# Patient Record
Sex: FEMALE | Race: WHITE | URBAN - METROPOLITAN AREA
[De-identification: names, ages, dates, MRNs, and addresses within clinical notes are randomized per-mention and may not be internally consistent; named-entity substitution may affect disease eponyms.]

---

## 2017-07-12 ENCOUNTER — PRENATAL OFFICE VISIT (OUTPATIENT)
Dept: FAMILY MEDICINE | Facility: CLINIC | Age: 37
End: 2017-07-12
Payer: COMMERCIAL

## 2017-07-12 VITALS
WEIGHT: 124.3 LBS | DIASTOLIC BLOOD PRESSURE: 62 MMHG | SYSTOLIC BLOOD PRESSURE: 104 MMHG | HEIGHT: 67 IN | TEMPERATURE: 98.5 F | OXYGEN SATURATION: 99 % | HEART RATE: 72 BPM | BODY MASS INDEX: 19.51 KG/M2

## 2017-07-12 DIAGNOSIS — Z32.01 PREGNANCY TEST POSITIVE: ICD-10-CM

## 2017-07-12 DIAGNOSIS — N91.2 ABSENCE OF MENSTRUATION: Primary | ICD-10-CM

## 2017-07-12 LAB — BETA HCG QUAL IFA URINE: POSITIVE

## 2017-07-12 PROCEDURE — 99213 OFFICE O/P EST LOW 20 MIN: CPT | Performed by: FAMILY MEDICINE

## 2017-07-12 PROCEDURE — 84703 CHORIONIC GONADOTROPIN ASSAY: CPT | Performed by: FAMILY MEDICINE

## 2017-07-12 NOTE — NURSING NOTE
"No chief complaint on file.      Initial /62 (BP Location: Right arm, Patient Position: Chair, Cuff Size: Adult Regular)  Pulse 72  Temp 98.5  F (36.9  C) (Oral)  Ht 5' 7\" (1.702 m)  Wt 124 lb 4.8 oz (56.4 kg)  LMP 05/21/2017 (Exact Date)  SpO2 99%  BMI 19.47 kg/m2 Estimated body mass index is 19.47 kg/(m^2) as calculated from the following:    Height as of this encounter: 5' 7\" (1.702 m).    Weight as of this encounter: 124 lb 4.8 oz (56.4 kg).  Medication Reconciliation: complete   Aileen Daniels MA  "

## 2017-07-12 NOTE — MR AVS SNAPSHOT
"              After Visit Summary   7/12/2017    Megan Young    MRN: [id number]           Patient Information     Date Of Birth          1980        Visit Information        Provider Department      7/12/2017 1:45 PM Alisia Chen MD [facility]        Today's Diagnoses     Absence of menstruation    -  1    Pregnancy test positive           Follow-ups after your visit        Who to contact     If you have questions or need follow up information about today's clinic visit or your schedule please contact [facility] directly at [phone].  Normal or non-critical lab and imaging results will be communicated to you by [employer], letter or phone within 4 business days after the clinic has received the results. If you do not hear from us within 7 days, please contact the clinic through Waynautt or phone. If you have a critical or abnormal lab result, we will notify you by phone as soon as possible.  Submit refill requests through Oxsensis or call your pharmacy and they will forward the refill request to us. Please allow 3 business days for your refill to be completed.          Additional Information About Your Visit        MyChart Information     Oxsensis gives you secure access to your electronic health record. If you see a primary care provider, you can also send messages to your care team and make appointments. If you have questions, please call your primary care clinic.  If you do not have a primary care provider, please call [phone] and they will assist you.        Care EveryWhere ID     This is your Care EveryWhere ID. This could be used by other organizations to access your [place] medical records  [id number]        Your Vitals Were     Pulse Temperature Height Last Period Pulse Oximetry BMI (Body Mass Index)    72 98.5  F (36.9  C) (Oral) 5' 7\" (1.702 m) 05/21/2017 (Exact Date) 99% 19.47 kg/m2       Blood Pressure from Last 3 Encounters:   07/12/17 104/62   01/07/16 " 109/63   11/18/15 134/86    Weight from Last 3 Encounters:   07/12/17 124 lb 4.8 oz (56.4 kg)   01/07/16 130 lb (59 kg)   11/18/15 139 lb (63 kg)              We Performed the Following     Beta HCG qual IFA urine        Primary Care Provider Office Phone # Fax #    Alisia Chen [phone]       [facility] [address] [address]        Equal Access to Services     LELA ROBLES : Hadii aad ku hadasho Soomaali, waaxda luqadaha, qaybta kaalmada adeegyada, domingo bravo . So [facility] [phone].    ATENCIÓN: Si habla español, tiene a luis disposición servicios gratuitos de asistencia lingüística. Llame al [phone].    We comply with applicable federal civil rights laws and [place] laws. We do not discriminate on the basis of race, color, national origin, age, disability sex, sexual orientation or gender identity.            Thank you!     Thank you for choosing [facility]  for your care. Our goal is always to provide you with excellent care. Hearing back from our patients is one way we can continue to improve our services. Please take a few minutes to complete the written survey that you may receive in the mail after your visit with us. Thank you!             Your Updated Medication List - Protect others around you: Learn how to safely use, store and throw away your medicines at www.disposemymeds.org.          This list is accurate as of: 7/12/17  4:22 PM.  Always use your most recent med list.                   Brand Name Dispense Instructions for use Diagnosis    prenatal multivitamin  plus iron 27-0.8 MG Tabs per tablet      Take 1 tablet by mouth daily

## 2017-07-12 NOTE — PROGRESS NOTES
SUBJECTIVE:                                                    Megan Young is a 36 year old female who presents to clinic today for the following health issues:      Pregnancy Confirmation  Sure LMP   YURI 18  Today 7+3    Feels well   Taking PNV  Planned pregnancy  trying for 2 months  Feels well  No concerns  Had  with last pregnancy  Induced for SGA  Infant 6# but healthy  Int in combined OB care again        Problem list and histories reviewed & adjusted, as indicated.  Additional history: as documented    Patient Active Problem List   Diagnosis     CARDIOVASCULAR SCREENING; LDL GOAL LESS THAN 160     Need for Tdap vaccination     Supervision of normal first pregnancy     Supervision of normal first pregnancy in third trimester     Labor and delivery, indication for care     Pregnancy test positive     Past Surgical History:   Procedure Laterality Date     HC TOOTH EXTRACTION W/FORCEP      wisdom       Social History   Substance Use Topics     Smoking status: Never Smoker     Smokeless tobacco: Never Used     Alcohol use 0.6 oz/week     1 Glasses of wine per week      Comment: every month     Family History   Problem Relation Age of Onset     CANCER Father      kidney age 48     Lipids Paternal Grandmother      Lipids Maternal Grandfather      Hypertension Maternal Grandfather      CEREBROVASCULAR DISEASE Maternal Grandfather      Alzheimer Disease Maternal Grandfather      early dementia     Neurologic Disorder Mother      migraines     Hypertension Maternal Grandmother          Current Outpatient Prescriptions   Medication Sig Dispense Refill     Prenatal Vit-Fe Fumarate-FA (PRENATAL MULTIVITAMIN  PLUS IRON) 27-0.8 MG TABS Take 1 tablet by mouth daily         Reviewed and updated as needed this visit by clinical staff       Reviewed and updated as needed this visit by Provider         ROS:  Constitutional, HEENT, cardiovascular, pulmonary, gi and gu systems are negative, except as otherwise  "noted.    OBJECTIVE:                                                    /62 (BP Location: Right arm, Patient Position: Chair, Cuff Size: Adult Regular)  Pulse 72  Temp 98.5  F (36.9  C) (Oral)  Ht 5' 7\" (1.702 m)  Wt 124 lb 4.8 oz (56.4 kg)  LMP 05/21/2017 (Exact Date)  SpO2 99%  BMI 19.47 kg/m2  Body mass index is 19.47 kg/(m^2).  GENERAL APPEARANCE: healthy, alert and no distress    Results for orders placed or performed in visit on 07/12/17   Beta HCG qual IFA urine   Result Value Ref Range    Beta HCG Qual IFA Urine Positive (A) NEG        ASSESSMENT/PLAN:                                                    1. Absence of menstruation    - Beta HCG qual IFA urine    2. Pregnancy test positive  Reviewed pnc  Combined OB cares  FOBV at 10 WGA  Encouraged MFM and genetics discussion given age      Follow up with Provider - 10 WGA     Alisia Chen MD  [facility]  "

## 2017-08-10 ENCOUNTER — PRENATAL OFFICE VISIT (OUTPATIENT)
Dept: FAMILY MEDICINE | Facility: CLINIC | Age: 37
End: 2017-08-10
Payer: COMMERCIAL

## 2017-08-10 VITALS
HEART RATE: 81 BPM | HEIGHT: 67 IN | SYSTOLIC BLOOD PRESSURE: 111 MMHG | BODY MASS INDEX: 19.34 KG/M2 | WEIGHT: 123.2 LBS | DIASTOLIC BLOOD PRESSURE: 76 MMHG | OXYGEN SATURATION: 98 % | TEMPERATURE: 96.9 F

## 2017-08-10 DIAGNOSIS — Z34.81 ENCOUNTER FOR SUPERVISION OF OTHER NORMAL PREGNANCY IN FIRST TRIMESTER: Primary | ICD-10-CM

## 2017-08-10 LAB
ABO + RH BLD: NORMAL
ABO + RH BLD: NORMAL
BLD GP AB SCN SERPL QL: NORMAL
BLOOD BANK CMNT PATIENT-IMP: NORMAL
ERYTHROCYTE [DISTWIDTH] IN BLOOD BY AUTOMATED COUNT: 15.5 % (ref 10–15)
HCT VFR BLD AUTO: 34.2 % (ref 35–47)
HGB BLD-MCNC: 11.3 G/DL (ref 11.7–15.7)
MCH RBC QN AUTO: 26.5 PG (ref 26.5–33)
MCHC RBC AUTO-ENTMCNC: 33 G/DL (ref 31.5–36.5)
MCV RBC AUTO: 80 FL (ref 78–100)
PLATELET # BLD AUTO: 164 10E9/L (ref 150–450)
RBC # BLD AUTO: 4.27 10E12/L (ref 3.8–5.2)
SPECIMEN EXP DATE BLD: NORMAL
WBC # BLD AUTO: 7.6 10E9/L (ref 4–11)

## 2017-08-10 PROCEDURE — 86900 BLOOD TYPING SEROLOGIC ABO: CPT | Performed by: FAMILY MEDICINE

## 2017-08-10 PROCEDURE — 85027 COMPLETE CBC AUTOMATED: CPT | Performed by: FAMILY MEDICINE

## 2017-08-10 PROCEDURE — 86901 BLOOD TYPING SEROLOGIC RH(D): CPT | Performed by: FAMILY MEDICINE

## 2017-08-10 PROCEDURE — 86780 TREPONEMA PALLIDUM: CPT | Performed by: FAMILY MEDICINE

## 2017-08-10 PROCEDURE — 36415 COLL VENOUS BLD VENIPUNCTURE: CPT | Performed by: FAMILY MEDICINE

## 2017-08-10 PROCEDURE — 87340 HEPATITIS B SURFACE AG IA: CPT | Performed by: FAMILY MEDICINE

## 2017-08-10 PROCEDURE — 87491 CHLMYD TRACH DNA AMP PROBE: CPT | Performed by: FAMILY MEDICINE

## 2017-08-10 PROCEDURE — 87086 URINE CULTURE/COLONY COUNT: CPT | Performed by: FAMILY MEDICINE

## 2017-08-10 PROCEDURE — 87389 HIV-1 AG W/HIV-1&-2 AB AG IA: CPT | Performed by: FAMILY MEDICINE

## 2017-08-10 PROCEDURE — 99214 OFFICE O/P EST MOD 30 MIN: CPT | Performed by: FAMILY MEDICINE

## 2017-08-10 PROCEDURE — 86850 RBC ANTIBODY SCREEN: CPT | Performed by: FAMILY MEDICINE

## 2017-08-10 PROCEDURE — 87591 N.GONORRHOEAE DNA AMP PROB: CPT | Performed by: FAMILY MEDICINE

## 2017-08-10 PROCEDURE — 86762 RUBELLA ANTIBODY: CPT | Performed by: FAMILY MEDICINE

## 2017-08-10 NOTE — MR AVS SNAPSHOT
After Visit Summary   8/10/2017    Megan Young    MRN: [id number]           Patient Information     Date Of Birth          1980        Visit Information        Provider Department      8/10/2017 11:00 AM Alisia Chen MD [facility]        Today's Diagnoses     Encounter for supervision of other normal pregnancy in first trimester    -  1       Follow-ups after your visit        Future tests that were ordered for you today     Open Future Orders        Priority Expected Expires Ordered    US OB > 14 Weeks Complete Single Routine  8/10/2018 8/10/2017            Who to contact     If you have questions or need follow up information about today's clinic visit or your schedule please contact [facility] directly at [phone].  Normal or non-critical lab and imaging results will be communicated to you by MyChart, letter or phone within 4 business days after the clinic has received the results. If you do not hear from us within 7 days, please contact the clinic through MyChart or phone. If you have a critical or abnormal lab result, we will notify you by phone as soon as possible.  Submit refill requests through Heckyl or call your pharmacy and they will forward the refill request to us. Please allow 3 business days for your refill to be completed.          Additional Information About Your Visit        MyChart Information     Heckyl gives you secure access to your electronic health record. If you see a primary care provider, you can also send messages to your care team and make appointments. If you have questions, please call your primary care clinic.  If you do not have a primary care provider, please call [phone] and they will assist you.        Care EveryWhere ID     This is your Care EveryWhere ID. This could be used by other organizations to access your [place] medical records  [id number]        Your Vitals Were     Pulse Temperature Height Last  "Period Pulse Oximetry BMI (Body Mass Index)    81 96.9  F (36.1  C) (Oral) 5' 7\" (1.702 m) 05/21/2017 (Exact Date) 98% 19.3 kg/m2       Blood Pressure from Last 3 Encounters:   08/10/17 111/76   07/12/17 104/62   01/07/16 109/63    Weight from Last 3 Encounters:   08/10/17 123 lb 3.2 oz (55.9 kg)   07/12/17 124 lb 4.8 oz (56.4 kg)   01/07/16 130 lb (59 kg)              We Performed the Following     ABO/RH Type and Screen     Anti Treponema     CBC with Platelets     Chlamydia Tracomatis PCR     Hepatitis B surface antigen     HIV Antigen Antibody Combo     Neisseria Gonorrhoea PCR     Rubella Antibody IgG Quantitative     Urine Culture Aerobic Bacterial        Primary Care Provider Office Phone # Fax #    Ford CliffErnestina Chen [phone] [address] [address]        Equal Access to Services     LELA [facility] CenterLAITH : Hadii aad ku hadasho Soomaali, waaxda luqadaha, qaybta kaalmada adeegyada, waxay rejiin lalo bravo . So [facility] [phone].    ATENCIÓN: Si habla español, tiene a luis disposición servicios gratuitos de asistencia lingüística. Ll[facility] [phone].    We comply with applicable federal civil rights laws and [place] laws. We do not discriminate on the basis of race, color, national origin, age, disability sex, sexual orientation or gender identity.            Thank you!     Thank you for choosing [facility]  for your care. Our goal is always to provide you with excellent care. Hearing back from our patients is one way we can continue to improve our services. Please take a few minutes to complete the written survey that you may receive in the mail after your visit with us. Thank you!             Your Updated Medication List - Protect others around you: Learn how to safely use, store and throw away your medicines at www.disposemymeds.org.          This list is accurate as of: 8/10/17 11:45 AM.  Always use your most recent med list.       "             Brand Name Dispense Instructions for use Diagnosis    prenatal multivitamin plus iron 27-0.8 MG Tabs per tablet      Take 1 tablet by mouth daily

## 2017-08-10 NOTE — NURSING NOTE
"Chief Complaint   Patient presents with     Prenatal Care     /76  Pulse 81  Temp 96.9  F (36.1  C) (Oral)  Ht 5' 7\" (1.702 m)  Wt 123 lb 3.2 oz (55.9 kg)  LMP 05/21/2017 (Exact Date)  SpO2 98%  BMI 19.3 kg/m2 Estimated body mass index is 19.3 kg/(m^2) as calculated from the following:    Height as of this encounter: 5' 7\" (1.702 m).    Weight as of this encounter: 123 lb 3.2 oz (55.9 kg).  BP completed using cuff size: regular       Health Maintenance due pending provider review:  NONE    n/a      Emily Love CMA  "

## 2017-08-10 NOTE — PROGRESS NOTES
"  SUBJECTIVE: Megan Young is an 36 year old female here for initial OB visit.    Past Medical History of Father of Baby: No significant medical history    Sure LMP 5/21  Today 11+4  Feels well  YURI 2/26/18    Review of Systems:   Constitutional, HEENT, cardiovascular, pulmonary, gi and gu systems are negative, except as otherwise noted.       History Since Last Menstrual Period: No Problems    EXAM: Blood pressure 111/76, pulse 81, temperature 96.9  F (36.1  C), temperature source Oral, height 5' 7\" (1.702 m), weight 123 lb 3.2 oz (55.9 kg), last menstrual period 05/21/2017, SpO2 98 %, currently breastfeeding.  GENERAL APPEARANCE: healthy, alert and no distress  EYES: EOMI,  PERRL  HENT: ear canals and TM's normal and nose and mouth without ulcers or lesions  RESP: lungs clear to auscultation - no rales, rhonchi or wheezes  CV: regular rates and rhythm, normal S1 S2, no S3 or S4 and no murmur, click or rub -  ABDOMEN:  soft, nontender, no HSM or masses and bowel sounds normal    Pelvix exam:  Perineum: Intact;   Vulva: Normal;  Vagina: Normal mucosa, no discharge  Cervix: Parous, closed, mobile, no discharge;  Uterus: 11 weeks, 11cm, Retroverted, mobile;   Adnexa: Normal;  Rectum: Normal without lesion or mass;   Bony Pelvis: Adequate.     ASSESSMENT/ PLAN:  (Z34.81) Encounter for supervision of other normal pregnancy in first trimester  (primary encounter diagnosis)  Comment:   Plan: CBC with Platelets, HIV Antigen Antibody Combo,        Rubella Antibody IgG Quantitative, Hepatitis B         surface antigen, Anti Treponema, ABO/RH Type         and Screen, Urine Culture Aerobic Bacterial,         Neisseria Gonorrhoea PCR, Chlamydia Tracomatis         PCR, US OB > 14 Weeks Complete Single          Age 36, CHRISTOPHER noted   Reviewed option of seeing MFM for optional genetic counseling - she declined this - no concerns but will talk to her  further  Follow up in 4 weeks.  Normal exercise.  Normal sexual " activity.  Prenatal vitamins.

## 2017-08-11 LAB
BACTERIA SPEC CULT: NO GROWTH
C TRACH DNA SPEC QL NAA+PROBE: NORMAL
HBV SURFACE AG SERPL QL IA: NONREACTIVE
HIV 1+2 AB+HIV1 P24 AG SERPL QL IA: NORMAL
MICRO REPORT STATUS: NORMAL
N GONORRHOEA DNA SPEC QL NAA+PROBE: NORMAL
RUBV IGG SERPL IA-ACNC: 60 IU/ML
SPECIMEN SOURCE: NORMAL
T PALLIDUM IGG+IGM SER QL: NEGATIVE

## 2017-08-28 ENCOUNTER — TELEPHONE (OUTPATIENT)
Dept: FAMILY MEDICINE | Facility: CLINIC | Age: 37
End: 2017-08-28

## 2017-08-28 NOTE — TELEPHONE ENCOUNTER
Reason for Call:  Other Referral    Detailed comments: For her Ultra sound, she has reached the cut off age and needs a referral to get this done    Phone Number Patient can be reached at: Home number on file [phone] (home)    Best Time: anytime    Can we leave a detailed message on this number? YES    Call taken on 8/28/2017 at 10:32 AM by Cynthia Hansen

## 2017-08-29 NOTE — TELEPHONE ENCOUNTER
Cynthia you message below what does that mean? She reached the cut off age?    Bhakti Pritchett  Referral Coordinator

## 2017-09-06 ENCOUNTER — PRENATAL OFFICE VISIT (OUTPATIENT)
Dept: FAMILY MEDICINE | Facility: CLINIC | Age: 37
End: 2017-09-06
Payer: COMMERCIAL

## 2017-09-06 VITALS
DIASTOLIC BLOOD PRESSURE: 62 MMHG | SYSTOLIC BLOOD PRESSURE: 100 MMHG | WEIGHT: 126.8 LBS | HEART RATE: 66 BPM | OXYGEN SATURATION: 99 % | TEMPERATURE: 97.8 F | HEIGHT: 67 IN | BODY MASS INDEX: 19.9 KG/M2 | RESPIRATION RATE: 14 BRPM

## 2017-09-06 DIAGNOSIS — Z34.02 ENCOUNTER FOR SUPERVISION OF NORMAL FIRST PREGNANCY IN SECOND TRIMESTER: Primary | ICD-10-CM

## 2017-09-06 PROCEDURE — 99212 OFFICE O/P EST SF 10 MIN: CPT | Performed by: FAMILY MEDICINE

## 2017-09-06 NOTE — NURSING NOTE
"Chief Complaint   Patient presents with     Prenatal Care     14 weeks       Initial /62  Pulse 66  Temp 97.8  F (36.6  C) (Oral)  Resp 14  Ht 5' 7\" (1.702 m)  Wt 126 lb 12.8 oz (57.5 kg)  LMP 05/21/2017 (Exact Date)  SpO2 99%  Breastfeeding? No  BMI 19.86 kg/m2 Estimated body mass index is 19.86 kg/(m^2) as calculated from the following:    Height as of this encounter: 5' 7\" (1.702 m).    Weight as of this encounter: 126 lb 12.8 oz (57.5 kg).  BP completed using cuff size: regular    Health Maintenance that is potentially due pending provider review:  Health Maintenance Due   Topic Date Due     MATERNAL SCREENING  09/03/2017     INFLUENZA VACCINE (SYSTEM ASSIGNED)  09/01/2017         Per provider  "

## 2017-09-06 NOTE — MR AVS SNAPSHOT
After Visit Summary   9/6/2017    Megan Young    MRN: [id number]           Patient Information     Date Of Birth          1980        Visit Information        Provider Department      9/6/2017 1:30 PM Alisia Chen MD [facility]        Today's Diagnoses     Encounter for supervision of normal first pregnancy in second trimester    -  1      Care Instructions    [facility] ultrasound and visit with Genetics: [phone]          Follow-ups after your visit        Additional Services     MAT FETAL MED CTR REFERRAL-PREGNANCY       >> Patient may proceed with recommendations for further testing as directed by the Maternal Fetal Medicine Specialist >>    >> If requesting Fetal Echo: M will determine appropriate location for exam due to indication.    >> If requesting Lung Maturity Amnio:  If results indicate fetal lung maturity, induction or C/S is recommended within 36 hours.  Please schedule accordingly.     Dear Patient:   Please be aware that coverage of these services is subject to the terms and limitations of your health insurance plan.  Call member services at your health plan with any benefit or coverage questions.      Please bring the following to your appointment:    >>  Any x-rays, CTs or MRIs which have been performed.  Contact the facility where they were done to arrange for  prior to your scheduled appointment.  Any new CT, MRI or other procedures ordered by your specialist must be performed at a [facility] facility or coordinated by your clinic's referral office.  >>  List of current medications   >>  This referral request   >>  Any documents/labs given to you for this referral                  Future tests that were ordered for you today     Open Future Orders        Priority Expected Expires Ordered    [facility] Genetic Counseling Routine  9/7/2018 9/6/2017    [facility] US Comprehensive Single Routine  7/6/2018 9/6/2017            Who to contact     If you have  "questions or need follow up information about today's clinic visit or your schedule please contact [facility] directly at [phone].  Normal or non-critical lab and imaging results will be communicated to you by MyChart, letter or phone within 4 business days after the clinic has received the results. If you do not hear from us within 7 days, please contact the clinic through Instacoachhart or phone. If you have a critical or abnormal lab result, we will notify you by phone as soon as possible.  Submit refill requests through VUELOGIC or call your pharmacy and they will forward the refill request to us. Please allow 3 business days for your refill to be completed.          Additional Information About Your Visit        [employer] Information     VUEL[employer]IC gives you secure access to your electronic health record. If you see a primary care provider, you can also send messages to your care team and make appointments. If you have questions, please call your primary care clinic.  If you do not have a primary care provider, please call [phone] and they will assist you.        Care EveryWhere ID     This is your Care EveryWhere ID. This could be used by other organizations to access your [place] medical records  [id number]        Your Vitals Were     Pulse Temperature Respirations Height Last Period Pulse Oximetry    66 97.8  F (36.6  C) (Oral) 14 5' 7\" (1.702 m) 05/21/2017 (Exact Date) 99%    Breastfeeding? BMI (Body Mass Index)                No 19.86 kg/m2           Blood Pressure from Last 3 Encounters:   09/06/17 100/62   08/10/17 111/76   07/12/17 104/62    Weight from Last 3 Encounters:   09/06/17 126 lb 12.8 oz (57.5 kg)   08/10/17 123 lb 3.2 oz (55.9 kg)   07/12/17 124 lb 4.8 oz (56.4 kg)              We Performed the Following     MAT FETAL MED CTR REFERRAL-PREGNANCY        Primary Care Provider Office Phone # Fax #    Alisia Chen [phone]       [address] [facility] [address]  [facility] [address]        Equal Access to Services     LELA ROBLES : Hadii aad ku hadtrammaría Pruitt, walandon martinez, domingo pierce. So [facility] [phone].    ATENCIÓN: Si habla español, tiene a luis disposición servicios gratuitos de asistencia lingüística. Llame al [phone].    We comply with applicable federal civil rights laws and [place] laws. We do not discriminate on the basis of race, color, national origin, age, disability sex, sexual orientation or gender identity.            Thank you!     Thank you for choosing [facility]  for your care. Our goal is always to provide you with excellent care. Hearing back from our patients is one way we can continue to improve our services. Please take a few minutes to complete the written survey that you may receive in the mail after your visit with us. Thank you!             Your Updated Medication List - Protect others around you: Learn how to safely use, store and throw away your medicines at www.disposemymeds.org.          This list is accurate as of: 9/6/17 11:59 PM.  Always use your most recent med list.                   Brand Name Dispense Instructions for use Diagnosis    prenatal multivitamin plus iron 27-0.8 MG Tabs per tablet      Take 1 tablet by mouth daily

## 2017-09-06 NOTE — PROGRESS NOTES
SUBJECTIVE:   Megan Young is a 36 year old female who presents to clinic today for the following health issues:      Prenatal care  15w4d  No concerns   Feels well  Denies LOF, VB CTNS  Due for: no screens - declines additional screens today  Plans for MFM /genetics  referral for routine US given age >35    Anticipatory guidance reviewed  RTC 4 weeks with DILLON  SN

## 2017-09-26 ENCOUNTER — PRE VISIT (OUTPATIENT)
Dept: MATERNAL FETAL MEDICINE | Facility: CLINIC | Age: 37
End: 2017-09-26

## 2017-09-28 ENCOUNTER — OFFICE VISIT (OUTPATIENT)
Dept: MATERNAL FETAL MEDICINE | Facility: CLINIC | Age: 37
End: 2017-09-28
Attending: FAMILY MEDICINE
Payer: COMMERCIAL

## 2017-09-28 ENCOUNTER — PRENATAL OFFICE VISIT (OUTPATIENT)
Dept: MIDWIFE SERVICES | Facility: CLINIC | Age: 37
End: 2017-09-28
Payer: COMMERCIAL

## 2017-09-28 ENCOUNTER — HOSPITAL ENCOUNTER (OUTPATIENT)
Dept: ULTRASOUND IMAGING | Facility: CLINIC | Age: 37
Discharge: HOME | End: 2017-09-28
Attending: FAMILY MEDICINE | Admitting: OBSTETRICS & GYNECOLOGY
Payer: COMMERCIAL

## 2017-09-28 VITALS
HEART RATE: 80 BPM | BODY MASS INDEX: 20.36 KG/M2 | SYSTOLIC BLOOD PRESSURE: 102 MMHG | OXYGEN SATURATION: 97 % | TEMPERATURE: 98 F | WEIGHT: 130 LBS | DIASTOLIC BLOOD PRESSURE: 69 MMHG

## 2017-09-28 DIAGNOSIS — O26.90 PREGNANCY RELATED CONDITION, UNSPECIFIED TRIMESTER: ICD-10-CM

## 2017-09-28 DIAGNOSIS — O09.522 AMA (ADVANCED MATERNAL AGE) MULTIGRAVIDA 35+, SECOND TRIMESTER: Primary | ICD-10-CM

## 2017-09-28 DIAGNOSIS — Z34.03 SUPERVISION OF NORMAL FIRST PREGNANCY IN THIRD TRIMESTER: ICD-10-CM

## 2017-09-28 DIAGNOSIS — Z23 NEED FOR PROPHYLACTIC VACCINATION AND INOCULATION AGAINST INFLUENZA: Primary | ICD-10-CM

## 2017-09-28 DIAGNOSIS — Z82.79 FAMILY HISTORY OF CONGENITAL HEART DISEASE: ICD-10-CM

## 2017-09-28 PROCEDURE — 90686 IIV4 VACC NO PRSV 0.5 ML IM: CPT | Performed by: ADVANCED PRACTICE MIDWIFE

## 2017-09-28 PROCEDURE — 90471 IMMUNIZATION ADMIN: CPT | Performed by: ADVANCED PRACTICE MIDWIFE

## 2017-09-28 PROCEDURE — 40000072 ZZH STATISTIC GENETIC COUNSELING, < 16 MIN: Mod: ZF | Performed by: GENETIC COUNSELOR, MS

## 2017-09-28 PROCEDURE — 76811 OB US DETAILED SNGL FETUS: CPT

## 2017-09-28 PROCEDURE — 99212 OFFICE O/P EST SF 10 MIN: CPT | Mod: 25 | Performed by: ADVANCED PRACTICE MIDWIFE

## 2017-09-28 NOTE — MR AVS SNAPSHOT
After Visit Summary   9/28/2017    Megan Young    MRN: [id number]           Patient Information     Date Of Birth          1980        Visit Information        Provider Department      9/28/2017 10:00 AM Chriss Sky MD [facility] Maternal Fetal Medicine [facility]        Today's Diagnoses     AMA (advanced maternal age) multigravida 35+, second trimester    -  1       Follow-ups after your visit        Your next 10 appointments already scheduled     Sep 28, 2017 10:30 AM CDT   ESTABLISHED PRENATAL with Arline Schaffer CNM   [facility] ([facility])    [address] [place] [address] [phone]              Who to contact     If you have questions or need follow up information about today's clinic visit or your schedule please contact [facility] MATERNAL FETAL MEDICINE [facility] directly at [phone].  Normal or non-critical lab and imaging results will be communicated to you by RxAdvancehart, letter or phone within 4 business days after the clinic has received the results. If you do not hear from us within 7 days, please contact the clinic through [employer] or phone. If you have a critical or abnormal lab result, we will notify you by phone as soon as possible.  Submit refill requests through Lifeblob or call your pharmacy and they will forward the refill request to us. Please allow 3 business days for your refill to be completed.          Additional Information About Your Visit        MyChart Information     Lifeblob gives you secure access to your electronic health record. If you see a primary care provider, you can also send messages to your care team and make appointments. If you have questions, please call your primary care clinic.  If you do not have a primary care provider, please call [phone] and they will assist you.        Care EveryWhere ID     This is your Care EveryWhere ID. This could be used by other  organizations to access your [place] medical records  [id number]        Your Vitals Were     Last Period                   05/21/2017 (Exact Date)            Blood Pressure from Last 3 Encounters:   09/06/17 100/62   08/10/17 111/76   07/12/17 104/62    Weight from Last 3 Encounters:   09/06/17 57.5 kg (126 lb 12.8 oz)   08/10/17 55.9 kg (123 lb 3.2 oz)   07/12/17 56.4 kg (124 lb 4.8 oz)              Today, you had the following     No orders found for display       Primary Care Provider Office Phone # Fax #    Alisia Chen [phone] [address] [address]        Equal Access to Services     ARTHUR ROBLES : Hadii aad ku hadasho Soomaali, waaxda luqadaha, qaybta kaalmada adeegyada, domingo bravo . So [facility] [phone].    ATENCIÓN: Si habla español, tiene a luis disposición servicios gratuitos de asistencia lingüística. Llame al [phone].    We comply with applicable federal civil rights laws and [place] laws. We do not discriminate on the basis of race, color, national origin, age, disability sex, sexual orientation or gender identity.            Thank you!     Thank you for choosing [facility]  for your care. Our goal is always to provide you with excellent care. Hearing back from our patients is one way we can continue to improve our services. Please take a few minutes to complete the written survey that you may receive in the mail after your visit with us. Thank you!             Your Updated Medication List - Protect others around you: Learn how to safely use, store and throw away your medicines at www.disposemymeds.org.          This list is accurate as of: 9/28/17 10:24 AM.  Always use your most recent med list.                   Brand Name Dispense Instructions for use Diagnosis    prenatal multivitamin plus iron 27-0.8 MG Tabs per tablet      Take 1 tablet by mouth daily

## 2017-09-28 NOTE — PROGRESS NOTES
"Please see \"Imaging\" tab under \"Chart Review\" for details of today's US at the [facility].    Chriss Sky MD  Maternal-Fetal Medicine      "

## 2017-09-28 NOTE — MR AVS SNAPSHOT
After Visit Summary   9/28/2017    Megan Young    MRN: [id number]           Patient Information     Date Of Birth          1980        Visit Information        Provider Department      9/28/2017 10:30 AM Arline Schaffer CNM [facility]        Today's Diagnoses     Need for prophylactic vaccination and inoculation against influenza    -  1    Supervision of normal first pregnancy in third trimester           Follow-ups after your visit        Follow-up notes from your care team     Return in about 4 weeks (around 10/26/2017).      Your next 10 appointments already scheduled     Oct 23, 2017  9:30 AM CDT   TAMIKA READ SCREEN FETAL EHCO [place] with [id number]   [facility] [place] ([facility])    [address]  [facility] [address] [phone]            Oct 23, 2017 10:00 AM CDT   Radiology MD with UR TAMIKA CABRAL   [facility] [place] ([facility])    [address]  [facility] [address]   [phone]           Please arrive at the time given for your first appointment. This visit is used internally to schedule the physician's time during your ultrasound.              Future tests that were ordered for you today     Open Future Orders        Priority Expected Expires Ordered    SELAM Read Screen Fetal Echo Single Routine  7/28/2018 9/28/2017            Who to contact     If you have questions or need follow up information about today's clinic visit or your schedule please contact [facility] directly at [phone].  Normal or non-critical lab and imaging results will be communicated to you by MyChart, letter or phone within 4 business days after the clinic has received the results. If you do not hear from us within 7 days, please contact the clinic through MyChart or phone. If you have a critical or abnormal lab  result, we will notify you by phone as soon as possible.  Submit refill requests through Pond5 or call your pharmacy and they will forward the refill request to us. Please allow 3 business days for your refill to be completed.          Additional Information About Your Visit        Caprizahart Information     Pond5 gives you secure access to your electronic health record. If you see a primary care provider, you can also send messages to your care team and make appointments. If you have questions, please call your primary care clinic.  If you do not have a primary care provider, please call [phone] and they will assist you.        Care EveryWhere ID     This is your Care EveryWhere ID. This could be used by other organizations to access your [place] medical records  [id number]        Your Vitals Were     Pulse Temperature Last Period Pulse Oximetry BMI (Body Mass Index)       80 98  F (36.7  C) (Oral) 05/21/2017 (Exact Date) 97% 20.36 kg/m2        Blood Pressure from Last 3 Encounters:   09/28/17 102/69   09/06/17 100/62   08/10/17 111/76    Weight from Last 3 Encounters:   09/28/17 130 lb (59 kg)   09/06/17 126 lb 12.8 oz (57.5 kg)   08/10/17 123 lb 3.2 oz (55.9 kg)              We Performed the Following     ADMIN INFLUENZA (For MEDICARE Patients ONLY) []     FLU VAC, SPLIT VIRUS IM > 3 YO (QUADRIVALENT) [38810]     Vaccine Administration, Initial [65494]        Primary Care Provider Office Phone # Fax #    OtterbeinErnestina Chen [phone] [address] [address]        Equal Access to Services     [facility] Hospital[facility] : Hadjennifer Foster, domingo pierce. So [facility] [phone].    ATENCIÓN: Si habla español, tiene a luis disposición servicios gratuitos de asistencia lingüística. Kayla al [phone].    We comply with applicable federal civil rights laws and [place] laws. We do not  discriminate on the basis of race, color, national origin, age, disability sex, sexual orientation or gender identity.            Thank you!     Thank you for choosing [facility]  for your care. Our goal is always to provide you with excellent care. Hearing back from our patients is one way we can continue to improve our services. Please take a few minutes to complete the written survey that you may receive in the mail after your visit with us. Thank you!             Your Updated Medication List - Protect others around you: Learn how to safely use, store and throw away your medicines at www.disposemymeds.org.          This list is accurate as of: 9/28/17 11:27 AM.  Always use your most recent med list.                   Brand Name Dispense Instructions for use Diagnosis    prenatal multivitamin plus iron 27-0.8 MG Tabs per tablet      Take 1 tablet by mouth daily

## 2017-09-28 NOTE — PROGRESS NOTES
Feeling well.  Baby is active. Denies any leaking of fluid, vaginal bleeding, regular uterine contractions, or headaches or other concerns.  Had MFM US today.  They have the gender in an envelope and don't know if they will look or not.  049 education.    Discussed GCT at 24 weeks.    Reviewed to call [phone] for contractions, loss of fluid, vaginal bleeding, decreased fetal movement or any other questions or concerns.    RTC in 6 weeks.  Arline Schaffer, JASPAL, APRN, CNM

## 2017-09-28 NOTE — PROGRESS NOTES
Injectable Influenza Immunization Documentation    1.  Is the person to be vaccinated sick today?   No    2. Does the person to be vaccinated have an allergy to a component   of the vaccine?   No    3. Has the person to be vaccinated ever had a serious reaction   to influenza vaccine in the past?   No    4. Has the person to be vaccinated ever had Guillain-Barré syndrome?   No    Form completed by Rogerio RODRIGUEZ

## 2017-09-28 NOTE — PROGRESS NOTES
[facility]  Genetic Counseling Consult    Patient:  Megan Young YOB: 1980   Date of Service:  17      Megan Young was seen at the [facility] with her  for genetic consultation as part of her appointment for comprehensive ultrasound.  The indication for genetic counseling is advanced maternal age.       Impression/Plan:   1. Megan has not had serum screening in this pregnancy.     2. Megan had a comprehensive (level II) ultrasound today, the results of which were normal.  Please see the ultrasound report for further details.    3. The patient declines genetic amniocentesis and maternal serum screening today.     4.   Megan will return for a fetal echocardiogram to assess fetal heart structures given paternal family history of congenital heart defects.     Pregnancy History:   /Parity:    Age at Delivery: 37 year old  YURI: 2018, by Last Menstrual Period  Gestational Age: 18w4d    No significant complications or exposures were reported in the current pregnancy.    Megan s pregnancy history is significant for 1 prior full term pregnancy with no reported complications.    Medical History:   Megan s reported medical history is not expected to impact pregnancy management or risks to fetal development.       Family History:   A three-generation pedigree was obtained, and is scanned under the  Media  tab.   The following significant findings were reported by Megan:    1 son, 2 years old, healthy and developmentally on track, parents were told by one pediatrician of a possible heart murmur, but no follow up was discussed and no other physicians have noted any concerns.      1 brother and 1 sister, both healthy    Father  at 46 from kidney cancer    Mother alive and well in her mid 60s    FOB: 37, healthy, with no children from any other relationships.     FOB: 1 sister and 1  "brother, both healthy, with 3 healthy children each    FOB: Mother diagnosed with breast cancer at 50 years of age, currently healthy    FOB: Father with a heart murmer and reported \"hole in heart since birth\" otherwise healthy    FOB: Maternal grandmother  from metastatic disease secondary to ovarian cancer    FOB: Paternal uncle with Parkinson's disease, onset believed to be in his 50's.       Otherwise, the reported family history is negative for multiple miscarriages, stillbirths, birth defects, cognitive impairment, known genetic conditions, and consanguinity.    Congenital Heart Defect:  We discussed that birth defects such as a hole in the heart can have genetic components, and can be seen to run in families.  While we did not discuss specific risk numbers, we did talk in general terms about the possibility of increased risk for the current pregnancy to have a change in the development of the heart.  We discussed that screening for this can be done through a specialized ultrasound, typically done after 22 weeks gestation.     Hereditary Breast and Ovarian Cancer:  There are genetic changes that can put individuals at increased risk for developing certain types of cancer.  Roughly 1 in 400 individuals has a mutation in either the BRCA1 or BRCA2 gene that puts them at increased risk for developing multiple types of cancer, including breast, ovarian, pancreatic, and melanoma for women, and breast, prostate, pancreatic, and melanoma for men.  Red flags in a family history that indicate a possible hereditary cancer syndrome include early onset breast cancer, ovarian cancer at any age, and multiple generations of individuals affected with cancer.  We discussed that Megan's 's family history is suggestive of a possible hereditary cancer syndrome, and that further evaluation by a genetic counselor who specializes in these conditions could prove useful for this family.  Megan and her  reported " that they have a lot of stressors in their lives right now, including the current pregnancy, possible employment changes, and a possible relocation out of the country for their family, but that they would consider following up on this when they feel less stressed.  I provided them with the contact information for [facility] if they have any further questions or concerns.           Carrier Screening:   The patient reports that she and the father of the pregnancy have  ancestry:      Cystic fibrosis is an autosomal recessive genetic condition that occurs with increased frequency in individuals of  ancestry and carrier screening for this condition is available.  In addition,  screening in the [facility] includes cystic fibrosis.      Expanded carrier screening for mutations in a large panel of genes associated with autosomal recessive conditions including cystic fibrosis, spinal muscular atrophy, and others, is now available.      The patient has declined the carrier screening options reviewed today.       Risk Assessment for Chromosome Conditions:   We explained that the risk for fetal chromosome abnormalities increases with maternal age. We discussed specific features of common chromosome abnormalities, including Down syndrome, trisomy 13, trisomy 18, and sex chromosome trisomies.      - At age 37 at midtrimester, the risk to have a baby with Down syndrome is 1 in 216.     - At age 37 at midtrimester, the risk to have a baby with any chromosome abnormality is 1 in 105.     - At age 37 at delivery, the risk to have a baby with Down syndrome is 1 in 294.     - At age 37 at delivery, the risk to have a baby with any chromosome abnormality is 1 in 163.       Megan did not have maternal serum screening earlier in pregnancy.         Testing Options:   We discussed the following options:   Non-invasive Prenatal Testing (NIPT)    Maternal plasma cell-free DNA  testing; first trimester ultrasound with nuchal translucency and nasal bone assessment is recommended, when appropriate    Screens for fetal trisomy 21, trisomy 13, trisomy 18, and sex chromosome aneuploidy    Cannot screen for open neural tube defects; maternal serum AFP after 15 weeks is recommended     Genetic Amniocentesis    Invasive procedure typically performed in the second trimester by which amniotic fluid is obtained for the purpose of chromosome analysis and/or other prenatal genetic analysis    Diagnostic results; >99% sensitivity for fetal chromosome abnormalities    AFAFP measurement tests for open neural tube defects     Comprehensive (Level II) ultrasound:     Detailed ultrasound performed between 18-22 weeks gestation.    Screen for major birth defects and markers for aneuploidy.      We reviewed the benefits and limitations of this testing.  Screening tests provide a risk assessment specific to the pregnancy for certain fetal chromosome abnormalities, but cannot definitively diagnose or exclude a fetal chromosome abnormality.  Follow-up genetic counseling and consideration of diagnostic testing is recommended with any abnormal screening result.     Diagnostic tests carry inherent risks- including risk of miscarriage- that require careful consideration.  These tests can detect fetal chromosome abnormalities with greater than 99% certainty.  Results can be compromised by maternal cell contamination or mosaicism, and are limited by the resolution of cytogenetic G-banding technology.  There is no screening nor diagnostic test that can detect all forms of birth defects or mental disability.    It was a pleasure to be involved with Megan [facility]. Face-to-face time of the meeting was 35 minutes.    Hammad Acevedo MS, [facility]  Certified Genetic Counselor  Phone: [phone]  Pager: [phone]

## 2017-09-28 NOTE — MR AVS SNAPSHOT
After Visit Summary   9/28/2017    Megan Young    MRN: [id number]           Patient Information     Date Of Birth          1980        Visit Information        Provider Department      9/28/2017 8:45 AM Hammad Acevedo GC [facility] Maternal Fetal Medicine [facility]        Today's Diagnoses     AMA (advanced maternal age) multigravida 35+, second trimester    -  1    Pregnancy related condition, unspecified trimester           Follow-ups after your visit        Your next 10 appointments already scheduled     Oct 23, 2017  9:30 AM CDT   M READ SCREEN FETAL EHCO [place] with [id number]   [facility] Maternal Fetal Medicine Ultrasound - [place] ([facility])    [address]  [facility] [address]   [phone]            Oct 23, 2017 10:00 AM CDT   Radiology MD with UR TAMIKA CABRAL   [facility] Maternal Fetal Medicine - [place] ([facility])    [address]  [facility] [address]   [phone]           Please arrive at the time given for your first appointment. This visit is used internally to schedule the physician's time during your ultrasound.              Future tests that were ordered for you today     Open Future Orders        Priority Expected Expires Ordered    [facility] Read Screen Fetal Echo Single Routine  7/28/2018 9/28/2017            Who to contact     If you have questions or need follow up information about today's clinic visit or your schedule please contact [facility] MATERNAL FETAL MEDICINE [facility] directly at [phone].  Normal or non-critical lab and imaging results will be communicated to you by MyChart, letter or phone within 4 business days after the clinic has received the results. If you do not hear from us within 7 days, please contact the clinic through MyChart or phone. If you have a critical or abnormal lab result, we will notify you by phone as soon as possible.  Submit refill  requests through [email or web address] or call your pharmacy and they will forward the refill request to us. Please allow 3 business days for your refill to be completed.          Additional Information About Your Visit        OneSpothart Information     [email or web address] gives you secure access to your electronic health record. If you see a primary care provider, you can also send messages to your care team and make appointments. If you have questions, please call your primary care clinic.  If you do not have a primary care provider, please call [phone] and they will assist you.        Care EveryWhere ID     This is your Care EveryWhere ID. This could be used by other organizations to access your [place] medical records  [id number]        Your Vitals Were     Last Period                   05/21/2017 (Exact Date)            Blood Pressure from Last 3 Encounters:   09/28/17 102/69   09/06/17 100/62   08/10/17 111/76    Weight from Last 3 Encounters:   09/28/17 59 kg (130 lb)   09/06/17 57.5 kg (126 lb 12.8 oz)   08/10/17 55.9 kg (123 lb 3.2 oz)              We Performed the Following     [facility] Genetic Counseling        Primary Care Provider Office Phone # Fax #    Alisia Chen [phone] [address] [place] [address]        Equal Access to Services     ARTHUR ROBLES : Hadii nena ku hadasho Soomaali, waaxda luqadaha, qaybta kaalmada adeegyada, domingo bravo . So [facility] [phone].    ATENCIÓN: Si habla español, tiene a luis disposición servicios gratuitos de asistencia lingüística. Llame al [phone].    We comply with applicable federal civil rights laws and [place] laws. We do not discriminate on the basis of race, color, national origin, age, disability sex, sexual orientation or gender identity.            Thank you!     Thank you for choosing [facility]  for your care. Our goal is always to provide you with excellent care.  Hearing back from our patients is one way we can continue to improve our services. Please take a few minutes to complete the written survey that you may receive in the mail after your visit with us. Thank you!             Your Updated Medication List - Protect others around you: Learn how to safely use, store and throw away your medicines at www.disposemymeds.org.          This list is accurate as of: 9/28/17 12:06 PM.  Always use your most recent med list.                   Brand Name Dispense Instructions for use Diagnosis    prenatal multivitamin plus iron 27-0.8 MG Tabs per tablet      Take 1 tablet by mouth daily

## 2017-10-11 ENCOUNTER — MYC MEDICAL ADVICE (OUTPATIENT)
Dept: FAMILY MEDICINE | Facility: CLINIC | Age: 37
End: 2017-10-11

## 2017-10-19 ENCOUNTER — TELEPHONE (OUTPATIENT)
Dept: MATERNAL FETAL MEDICINE | Facility: CLINIC | Age: 37
End: 2017-10-19

## 2017-10-19 NOTE — TELEPHONE ENCOUNTER
Patient had emailed  a copy of a 2007 report from her father's gastroenterology and hepatology clinic.  After genetic counselor (Hammad Acevedo) and physician review (Dr. Sky), patient does not need fetal echocardiogram for family history of congenital heart disease.  Removing order.     Ashli Giang

## 2017-11-16 ENCOUNTER — PRENATAL OFFICE VISIT (OUTPATIENT)
Dept: FAMILY MEDICINE | Facility: CLINIC | Age: 37
End: 2017-11-16
Payer: COMMERCIAL

## 2017-11-16 VITALS
TEMPERATURE: 97.9 F | HEART RATE: 88 BPM | HEIGHT: 67 IN | DIASTOLIC BLOOD PRESSURE: 68 MMHG | WEIGHT: 139.6 LBS | SYSTOLIC BLOOD PRESSURE: 118 MMHG | BODY MASS INDEX: 21.91 KG/M2

## 2017-11-16 DIAGNOSIS — O09.522 ELDERLY MULTIGRAVIDA IN SECOND TRIMESTER: ICD-10-CM

## 2017-11-16 LAB — HGB BLD-MCNC: 10.8 G/DL (ref 11.7–15.7)

## 2017-11-16 PROCEDURE — 85018 HEMOGLOBIN: CPT | Performed by: FAMILY MEDICINE

## 2017-11-16 PROCEDURE — 99207 ZZC PRENATAL VISIT: CPT | Performed by: FAMILY MEDICINE

## 2017-11-16 PROCEDURE — 36415 COLL VENOUS BLD VENIPUNCTURE: CPT | Performed by: FAMILY MEDICINE

## 2017-11-16 PROCEDURE — 82950 GLUCOSE TEST: CPT | Performed by: FAMILY MEDICINE

## 2017-11-16 NOTE — MR AVS SNAPSHOT
"              After Visit Summary   11/16/2017    Megan Young    MRN: [id number]           Patient Information     Date Of Birth          1980        Visit Information        Provider Department      11/16/2017 3:30 PM Alisia Chen MD [facility]        Today's Diagnoses     Elderly multigravida in second trimester           Follow-ups after your visit        Who to contact     If you have questions or need follow up information about today's clinic visit or your schedule please contact [facility] directly at [phone].  Normal or non-critical lab and imaging results will be communicated to you by Fundologyhart, letter or phone within 4 business days after the clinic has received the results. If you do not hear from us within 7 days, please contact the clinic through Fundologyhart or phone. If you have a critical or abnormal lab result, we will notify you by phone as soon as possible.  Submit refill requests through [employer] or call your pharmacy and they will forward the refill request to us. Please allow 3 business days for your refill to be completed.          Additional Information About Your Visit        MyChart Information     [employer] gives you secure access to your electronic health record. If you see a primary care provider, you can also send messages to your care team and make appointments. If you have questions, please call your primary care clinic.  If you do not have a primary care provider, please call [phone] and they will assist you.        Care EveryWhere ID     This is your Care EveryWhere ID. This could be used by other organizations to access your [facility] medical records  [id number]        Your Vitals Were     Pulse Temperature Height Last Period BMI (Body Mass Index)       88 97.9  F (36.6  C) (Oral) 5' 7\" (1.702 m) 05/21/2017 (Exact Date) 21.86 kg/m2        Blood Pressure from Last 3 Encounters:   11/16/17 118/68   09/28/17 102/69   09/06/17 100/62    " Weight from Last 3 Encounters:   11/16/17 139 lb 9.6 oz (63.3 kg)   09/28/17 130 lb (59 kg)   09/06/17 126 lb 12.8 oz (57.5 kg)              We Performed the Following     Glucose tolerance gest screen 1 hour     Hemoglobin        Primary Care Provider Office Phone # Fax #    TippecanoeErnestina Chen [phone]       [address] [address]        Equal Access to Services     ARTHUR ROBLES : Hadii aad ku hadasho Soomaali, waaxda luqadaha, qaybta kaalmada adeegyada, waxay idiin hayaan adeeg sukhwinder bravo . So [facility] [phone].    ATENCIÓN: Si habla español, tiene a luis disposición servicios gratuitos de asistencia lingüística. Shubhamame al [phone].    We comply with applicable federal civil rights laws and [place] laws. We do not discriminate on the basis of race, color, national origin, age, disability, sex, sexual orientation, or gender identity.            Thank you!     Thank you for choosing [facility]  for your care. Our goal is always to provide you with excellent care. Hearing back from our patients is one way we can continue to improve our services. Please take a few minutes to complete the written survey that you may receive in the mail after your visit with us. Thank you!             Your Updated Medication List - Protect others around you: Learn how to safely use, store and throw away your medicines at www.disposemymeds.org.          This list is accurate as of: 11/16/17  4:29 PM.  Always use your most recent med list.                   Brand Name Dispense Instructions for use Diagnosis    prenatal multivitamin plus iron 27-0.8 MG Tabs per tablet      Take 1 tablet by mouth daily

## 2017-11-16 NOTE — PROGRESS NOTES
Megan Young is a 37 year old female who presents at 25w4d for routine prenatal visit. Feels well  Reviewed her last imaging with MFM - did not have ane concerning findings.  Family history was not notable for any concerns requiring fetal ECHO  Denies LOF, VB CTNS  Good FM  Fetal heart tones in the 150's.   Fundal height 25cm consistent w/ 25w pregnancy.  Due for GTT and hgb today.   Anticipatory guidance reviewed  RTC 4 weeks  SN

## 2017-11-16 NOTE — NURSING NOTE
"Chief Complaint   Patient presents with     Prenatal Care     /68  Pulse 88  Temp 97.9  F (36.6  C) (Oral)  Ht 5' 7\" (1.702 m)  Wt 139 lb 9.6 oz (63.3 kg)  LMP 05/21/2017 (Exact Date)  BMI 21.86 kg/m2 Estimated body mass index is 21.86 kg/(m^2) as calculated from the following:    Height as of this encounter: 5' 7\" (1.702 m).    Weight as of this encounter: 139 lb 9.6 oz (63.3 kg).  Medication Reconciliation: complete      Health Maintenance due pending provider review:  NONE    n/a    Norma Camacho CMA  "

## 2017-11-18 LAB — GLUCOSE 1H P 50 G GLC PO SERPL-MCNC: 156 MG/DL (ref 60–129)

## 2017-11-20 PROBLEM — R73.09 ABNORMAL GLUCOSE TOLERANCE TEST: Status: ACTIVE | Noted: 2017-11-20

## 2017-11-20 NOTE — PROGRESS NOTES
Hello,    Lets set you up for the 3 hour test since the 1 hour test came back high.    The labs do show slight anemia - I would try extra iron in your diet -  this should help this to rise.  We can recheck this in the 3rd trimester.    Alisia Chen MD

## 2017-11-22 DIAGNOSIS — O09.522 ELDERLY MULTIGRAVIDA IN SECOND TRIMESTER: ICD-10-CM

## 2017-11-22 LAB
GLUCOSE 1H P 100 G GLC PO SERPL-MCNC: 133 MG/DL (ref 60–179)
GLUCOSE 2H P 100 G GLC PO SERPL-MCNC: 135 MG/DL (ref 60–154)
GLUCOSE 3H P 100 G GLC PO SERPL-MCNC: 109 MG/DL (ref 60–139)
GLUCOSE P FAST SERPL-MCNC: 64 MG/DL (ref 60–94)

## 2017-11-22 PROCEDURE — 82952 GTT-ADDED SAMPLES: CPT | Performed by: FAMILY MEDICINE

## 2017-11-22 PROCEDURE — 82951 GLUCOSE TOLERANCE TEST (GTT): CPT | Performed by: FAMILY MEDICINE

## 2017-11-22 PROCEDURE — 36415 COLL VENOUS BLD VENIPUNCTURE: CPT | Performed by: FAMILY MEDICINE

## 2017-12-18 ENCOUNTER — PRENATAL OFFICE VISIT (OUTPATIENT)
Dept: MIDWIFE SERVICES | Facility: CLINIC | Age: 37
End: 2017-12-18
Payer: COMMERCIAL

## 2017-12-18 VITALS
BODY MASS INDEX: 22.49 KG/M2 | SYSTOLIC BLOOD PRESSURE: 108 MMHG | DIASTOLIC BLOOD PRESSURE: 58 MMHG | HEIGHT: 67 IN | WEIGHT: 143.3 LBS | HEART RATE: 78 BPM

## 2017-12-18 DIAGNOSIS — O09.522 ELDERLY MULTIGRAVIDA IN SECOND TRIMESTER: ICD-10-CM

## 2017-12-18 PROBLEM — R73.09 ABNORMAL GLUCOSE TOLERANCE TEST: Status: RESOLVED | Noted: 2017-11-20 | Resolved: 2017-12-18

## 2017-12-18 PROBLEM — Z32.01 PREGNANCY TEST POSITIVE: Status: RESOLVED | Noted: 2017-07-12 | Resolved: 2017-12-18

## 2017-12-18 PROCEDURE — 99207 ZZC PRENATAL VISIT: CPT | Performed by: ADVANCED PRACTICE MIDWIFE

## 2017-12-18 NOTE — MR AVS SNAPSHOT
"              After Visit Summary   12/18/2017    Megan Young    MRN: [id number]           Patient Information     Date Of Birth          1980        Visit Information        Provider Department      12/18/2017 1:30 PM Fely Serrano APRN CNM [facility]        Today's Diagnoses     Elderly multigravida in second trimester           Follow-ups after your visit        Who to contact     If you have questions or need follow up information about today's clinic visit or your schedule please contact [facility] directly at [phone].  Normal or non-critical lab and imaging results will be communicated to you by Xspandhart, letter or phone within 4 business days after the clinic has received the results. If you do not hear from us within 7 days, please contact the clinic through Xspandhart or phone. If you have a critical or abnormal lab result, we will notify you by phone as soon as possible.  Submit refill requests through [employer] or call your pharmacy and they will forward the refill request to us. Please allow 3 business days for your refill to be completed.          Additional Information About Your Visit        MyChart Information     [employer] gives you secure access to your electronic health record. If you see a primary care provider, you can also send messages to your care team and make appointments. If you have questions, please call your primary care clinic.  If you do not have a primary care provider, please call [phone] and they will assist you.        Care EveryWhere ID     This is your Care EveryWhere ID. This could be used by other organizations to access your [facility] medical records  [id number]        Your Vitals Were     Pulse Height Last Period BMI (Body Mass Index)          78 5' 7\" (1.702 m) 05/21/2017 (Exact Date) 22.44 kg/m2         Blood Pressure from Last 3 Encounters:   12/18/17 108/58   11/16/17 118/68   09/28/17 102/69    Weight from Last 3 Encounters: "   12/18/17 143 lb 4.8 oz (65 kg)   11/16/17 139 lb 9.6 oz (63.3 kg)   09/28/17 130 lb (59 kg)              Today, you had the following     No orders found for display       Primary Care Provider Office Phone # Fax #    Alisia Chen [phone]       [address] [address]        Equal Access to Services     ARTHUR ROBLES : Hadii aad ku hadasho Soomaali, waaxda luqadaha, qaybta kaalmada adeegyada, waxay idiin hayaan adeeg angeliquearafátima lajohn . So [facility] [phone].    ATENCIÓN: Si habla español, tiene a luis disposición servicios gratuitos de asistencia lingüística. [facility] [phone].    We comply with applicable federal civil rights laws and [place] laws. We do not discriminate on the basis of race, color, national origin, age, disability, sex, sexual orientation, or gender identity.            Thank you!     Thank you for choosing [facility]  for your care. Our goal is always to provide you with excellent care. Hearing back from our patients is one way we can continue to improve our services. Please take a few minutes to complete the written survey that you may receive in the mail after your visit with us. Thank you!             Your Updated Medication List - Protect others around you: Learn how to safely use, store and throw away your medicines at www.disposemymeds.org.          This list is accurate as of: 12/18/17  1:58 PM.  Always use your most recent med list.                   Brand Name Dispense Instructions for use Diagnosis    prenatal multivitamin plus iron 27-0.8 MG Tabs per tablet      Take 1 tablet by mouth daily

## 2017-12-18 NOTE — NURSING NOTE
"No chief complaint on file.      Initial /58  Pulse 78  Ht 5' 7\" (1.702 m)  Wt 143 lb 4.8 oz (65 kg)  LMP 05/21/2017 (Exact Date)  BMI 22.44 kg/m2 Estimated body mass index is 22.44 kg/(m^2) as calculated from the following:    Height as of this encounter: 5' 7\" (1.702 m).    Weight as of this encounter: 143 lb 4.8 oz (65 kg).  Medication Reconciliation: complete    "

## 2017-12-18 NOTE — PROGRESS NOTES
30w1d  Here with : Ankit.  Feeling well.  Passed 3 hour GTT happy.   Does not know sex of baby but may find out before delivery (has envelope) plans to move to [facility] in June of this year.   No questions or concerns rtc in 2 weeks tom

## 2017-12-28 ENCOUNTER — TELEPHONE (OUTPATIENT)
Dept: FAMILY MEDICINE | Facility: CLINIC | Age: 37
End: 2017-12-28

## 2017-12-28 NOTE — TELEPHONE ENCOUNTER
Received form(s) from pt for FMLA.  Placed form(s) in/on SN's box.  Forms need to be filled out and signed and faxed to number listed on form..    Call pt to verify form was sent: No  Copy needs to be sent for scanning after completion: Yes    VANDANA Soto--please fill out if appropriate, and team can complete address/city/phone  etc

## 2018-01-03 ENCOUNTER — TELEPHONE (OUTPATIENT)
Dept: FAMILY MEDICINE | Facility: CLINIC | Age: 38
End: 2018-01-03

## 2018-01-03 ENCOUNTER — PRENATAL OFFICE VISIT (OUTPATIENT)
Dept: FAMILY MEDICINE | Facility: CLINIC | Age: 38
End: 2018-01-03
Payer: COMMERCIAL

## 2018-01-03 ENCOUNTER — ALLIED HEALTH/NURSE VISIT (OUTPATIENT)
Dept: NURSING | Facility: CLINIC | Age: 38
End: 2018-01-03
Payer: COMMERCIAL

## 2018-01-03 VITALS
HEIGHT: 67 IN | BODY MASS INDEX: 22.84 KG/M2 | WEIGHT: 145.5 LBS | RESPIRATION RATE: 16 BRPM | HEART RATE: 110 BPM | SYSTOLIC BLOOD PRESSURE: 100 MMHG | TEMPERATURE: 98.2 F | OXYGEN SATURATION: 96 % | DIASTOLIC BLOOD PRESSURE: 60 MMHG

## 2018-01-03 VITALS
HEART RATE: 71 BPM | RESPIRATION RATE: 16 BRPM | SYSTOLIC BLOOD PRESSURE: 108 MMHG | OXYGEN SATURATION: 96 % | DIASTOLIC BLOOD PRESSURE: 62 MMHG

## 2018-01-03 DIAGNOSIS — O09.522 ELDERLY MULTIGRAVIDA IN SECOND TRIMESTER: ICD-10-CM

## 2018-01-03 DIAGNOSIS — Z01.30 BP CHECK: Primary | ICD-10-CM

## 2018-01-03 DIAGNOSIS — R53.83 FATIGUE, UNSPECIFIED TYPE: Primary | ICD-10-CM

## 2018-01-03 DIAGNOSIS — R00.0 TACHYCARDIA: ICD-10-CM

## 2018-01-03 DIAGNOSIS — R53.83 FATIGUE, UNSPECIFIED TYPE: ICD-10-CM

## 2018-01-03 LAB
ERYTHROCYTE [DISTWIDTH] IN BLOOD BY AUTOMATED COUNT: 13.6 % (ref 10–15)
HCT VFR BLD AUTO: 35.7 % (ref 35–47)
HGB BLD-MCNC: 11.8 G/DL (ref 11.7–15.7)
MCH RBC QN AUTO: 28.6 PG (ref 26.5–33)
MCHC RBC AUTO-ENTMCNC: 33.1 G/DL (ref 31.5–36.5)
MCV RBC AUTO: 87 FL (ref 78–100)
PLATELET # BLD AUTO: 148 10E9/L (ref 150–450)
RBC # BLD AUTO: 4.12 10E12/L (ref 3.8–5.2)
WBC # BLD AUTO: 8.8 10E9/L (ref 4–11)

## 2018-01-03 PROCEDURE — 80048 BASIC METABOLIC PNL TOTAL CA: CPT | Performed by: FAMILY MEDICINE

## 2018-01-03 PROCEDURE — 99207 ZZC PRENATAL VISIT: CPT | Performed by: FAMILY MEDICINE

## 2018-01-03 PROCEDURE — 85027 COMPLETE CBC AUTOMATED: CPT | Performed by: FAMILY MEDICINE

## 2018-01-03 PROCEDURE — 36415 COLL VENOUS BLD VENIPUNCTURE: CPT | Performed by: FAMILY MEDICINE

## 2018-01-03 NOTE — TELEPHONE ENCOUNTER
Ok for vitals recheck after scheduled 4:30 lab today per Rosie.  Left detailed message.  Asked her to call back if questions.  Adele Chen, Alisia Do MD  [facility] [place] Triage                   She had some tachycardia today. Is 32 WGA pregnant   I asked her to return for labs and to call about hydration - left a voicemail     cna you schedule her for a lab only and nurse only for vitals recehck?     SN

## 2018-01-03 NOTE — MR AVS SNAPSHOT
After Visit Summary   1/3/2018    Megan Young    MRN: [id number]           Patient Information     Date Of Birth          1980        Visit Information        Provider Department      1/3/2018 4:15 PM UP NURSE [facility]        Today's Diagnoses     BP check    -  1       Follow-ups after your visit        Who to contact     If you have questions or need follow up information about today's clinic visit or your schedule please contact [facility] directly at [phone].  Normal or non-critical lab and imaging results will be communicated to you by Gelato Fiascohart, letter or phone within 4 business days after the clinic has received the results. If you do not hear from us within 7 days, please contact the clinic through ThermaSourcet or phone. If you have a critical or abnormal lab result, we will notify you by phone as soon as possible.  Submit refill requests through [employer] or call your pharmacy and they will forward the refill request to us. Please allow 3 business days for your refill to be completed.          Additional Information About Your Visit        MyChart Information     [employer] gives you secure access to your electronic health record. If you see a primary care provider, you can also send messages to your care team and make appointments. If you have questions, please call your primary care clinic.  If you do not have a primary care provider, please call [phone] and they will assist you.        Care EveryWhere ID     This is your Care EveryWhere ID. This could be used by other organizations to access your [facility] medical records  [id number]        Your Vitals Were     Pulse Respirations Last Period Pulse Oximetry          71 16 05/21/2017 (Exact Date) 96%         Blood Pressure from Last 3 Encounters:   01/03/18 108/62   01/03/18 100/60   12/18/17 108/58    Weight from Last 3 Encounters:   01/03/18 145 lb 8 oz (66 kg)   12/18/17 143 lb 4.8 oz (65 kg)   11/16/17 139  lb 9.6 oz (63.3 kg)              Today, you had the following     No orders found for display       Primary Care Provider Office Phone # Fax #    Alisia Chen [phone] [address] [address]        Equal Access to Services     LELA ROBLES : Hadii aad ku hadasho Soomaali, waaxda luqadaha, qaybta kaalmada adeegyada, waxay rejiin hayaan adeeg angeliquealeshafátima lajohn . So [facility] [phone].    ATENCIÓN: Si habla español, tiene a luis disposición servicios gratuitos de asistencia lingüística. Llame al [phone].    We comply with applicable federal civil rights laws and [place] laws. We do not discriminate on the basis of race, color, national origin, age, disability, sex, sexual orientation, or gender identity.            Thank you!     Thank you for choosing [facility]  for your care. Our goal is always to provide you with excellent care. Hearing back from our patients is one way we can continue to improve our services. Please take a few minutes to complete the written survey that you may receive in the mail after your visit with us. Thank you!             Your Updated Medication List - Protect others around you: Learn how to safely use, store and throw away your medicines at www.disposemymeds.org.          This list is accurate as of: 1/3/18  4:59 PM.  Always use your most recent med list.                   Brand Name Dispense Instructions for use Diagnosis    prenatal multivitamin plus iron 27-0.8 MG Tabs per tablet      Take 1 tablet by mouth daily

## 2018-01-03 NOTE — NURSING NOTE
"Chief Complaint   Patient presents with     Prenatal Care     initial /60 (BP Location: Right arm, Cuff Size: Adult Regular)  Pulse 110  Temp 98.2  F (36.8  C) (Oral)  Resp 16  Ht 5' 7\" (1.702 m)  Wt 145 lb 8 oz (66 kg)  LMP 05/21/2017 (Exact Date)  SpO2 96%  BMI 22.79 kg/m2 Estimated body mass index is 22.79 kg/(m^2) as calculated from the following:    Height as of this encounter: 5' 7\" (1.702 m).    Weight as of this encounter: 145 lb 8 oz (66 kg).  BP completed using cuff size: regular.   R arm      Health Maintenance that is potentially due pending provider review:  NONE    n/a    Ramone Harry ma  "

## 2018-01-03 NOTE — MR AVS SNAPSHOT
After Visit Summary   1/3/2018    Megan Young    MRN: [id number]           Patient Information     Date Of Birth          1980        Visit Information        Provider Department      1/3/2018 7:30 AM Alisia Chen MD [facility]        Today's Diagnoses     Fatigue, unspecified type    -  1    Elderly multigravida in second trimester        Tachycardia           Follow-ups after your visit        Future tests that were ordered for you today     Open Future Orders        Priority Expected Expires Ordered    CBC with platelets Routine 1/5/2018 2/3/2018 1/3/2018    Basic metabolic panel Routine 1/5/2018 2/3/2018 1/3/2018            Who to contact     If you have questions or need follow up information about today's clinic visit or your schedule please contact [facility] directly at [phone].  Normal or non-critical lab and imaging results will be communicated to you by MyChart, letter or phone within 4 business days after the clinic has received the results. If you do not hear from us within 7 days, please contact the clinic through DeliRadiohart or phone. If you have a critical or abnormal lab result, we will notify you by phone as soon as possible.  Submit refill requests through [employer] or call your pharmacy and they will forward the refill request to us. Please allow 3 business days for your refill to be completed.          Additional Information About Your Visit        MyChart Information     [employer] gives you secure access to your electronic health record. If you see a primary care provider, you can also send messages to your care team and make appointments. If you have questions, please call your primary care clinic.  If you do not have a primary care provider, please call [phone] and they will assist you.        Care EveryWhere ID     This is your Care EveryWhere ID. This could be used by other organizations to access your [facility]  "records  [id number]        Your Vitals Were     Pulse Temperature Respirations Height Last Period Pulse Oximetry    110 98.2  F (36.8  C) (Oral) 16 5' 7\" (1.702 m) 05/21/2017 (Exact Date) 96%    BMI (Body Mass Index)                   22.79 kg/m2            Blood Pressure from Last 3 Encounters:   01/03/18 100/60   12/18/17 108/58   11/16/17 118/68    Weight from Last 3 Encounters:   01/03/18 145 lb 8 oz (66 kg)   12/18/17 143 lb 4.8 oz (65 kg)   11/16/17 139 lb 9.6 oz (63.3 kg)               Primary Care Provider Office Phone # Fax #    Alisia Chen [phone] [address] [address]        Equal Access to Services     [facility]: Hadii nena lyncho Sosilva, waaxda luqadaha, qaybta kaalmada adeben, domingo bravo . So [facility] [phone].    ATENCIÓN: Si habla español, tiene a luis disposición servicios gratuitos de asistencia lingüística. Kayla al [phone].    We comply with applicable federal civil rights laws and [place] laws. We do not discriminate on the basis of race, color, national origin, age, disability, sex, sexual orientation, or gender identity.            Thank you!     Thank you for choosing [facility]  for your care. Our goal is always to provide you with excellent care. Hearing back from our patients is one way we can continue to improve our services. Please take a few minutes to complete the written survey that you may receive in the mail after your visit with us. Thank you!             Your Updated Medication List - Protect others around you: Learn how to safely use, store and throw away your medicines at www.disposemymeds.org.          This list is accurate as of: 1/3/18  8:54 AM.  Always use your most recent med list.                   Brand Name Dispense Instructions for use Diagnosis    prenatal multivitamin plus iron 27-0.8 MG Tabs per tablet      Take 1 tablet by mouth daily          "

## 2018-01-03 NOTE — TELEPHONE ENCOUNTER
Reason for Call:  Other returning call    Detailed comments: patient returned call to Dr Crump.   She scheduled labs for later this afternoon at 430 and will drink more water throughout the day.  Does she also need nurse only to recheck vitals?     Phone Number Patient can be reached at: Cell number on file:    Telephone Information:   Mobile [phone]     Best Time:     Can we leave a detailed message on this number? YES    Call taken on 1/3/2018 at 9:16 AM by Adeline Ball

## 2018-01-03 NOTE — NURSING NOTE
"Chief Complaint   Patient presents with     Allied Health Visit     Hypertension     /62  Pulse 71  Resp 16  LMP 05/21/2017 (Exact Date)  SpO2 96% Estimated body mass index is 22.79 kg/(m^2) as calculated from the following:    Height as of an earlier encounter on 1/3/18: 5' 7\" (1.702 m).    Weight as of an earlier encounter on 1/3/18: 145 lb 8 oz (66 kg).  bp completed using cuff size: regular       Health Maintenance addressed:  NONE    n/a    Debbie Menjivar MA     "

## 2018-01-03 NOTE — Clinical Note
She had some tachycardia today. Is 32 WGA pregnant I asked her to return for labs and to call about hydration - left a voicemail  cna you schedule her for a lab only and nurse only for vitals recehck?  SN

## 2018-01-03 NOTE — PROGRESS NOTES
Megan Young is a 37 year old female who presents with no concerns today  Feels well    32w3d  Denies LOF, VB CTNS  Good FM  Due for: no screens routinely today  Anticipatory guidance reviewed  RTC 2 weeks  SN     Addenda:  Pulse noted to be high today  Called her back to review hydration and fatigue - she feels she could be hydrating more and has fatigue  Will have her return for CBC and recheck of vitals after hydration    Alisia Chen MD

## 2018-01-04 LAB
ANION GAP SERPL CALCULATED.3IONS-SCNC: 8 MMOL/L (ref 3–14)
BUN SERPL-MCNC: 7 MG/DL (ref 7–30)
CALCIUM SERPL-MCNC: 8.3 MG/DL (ref 8.5–10.1)
CHLORIDE SERPL-SCNC: 107 MMOL/L (ref 94–109)
CO2 SERPL-SCNC: 25 MMOL/L (ref 20–32)
CREAT SERPL-MCNC: 0.44 MG/DL (ref 0.52–1.04)
GFR SERPL CREATININE-BSD FRML MDRD: >90 ML/MIN/1.7M2
GLUCOSE SERPL-MCNC: 93 MG/DL (ref 70–99)
POTASSIUM SERPL-SCNC: 4.3 MMOL/L (ref 3.4–5.3)
SODIUM SERPL-SCNC: 140 MMOL/L (ref 133–144)

## 2018-01-05 ENCOUNTER — MYC MEDICAL ADVICE (OUTPATIENT)
Dept: FAMILY MEDICINE | Facility: CLINIC | Age: 38
End: 2018-01-05

## 2018-01-05 NOTE — PROGRESS NOTES
Hello there!    I was concerned after I saw you that your heart rate was pretty high.  It could have been dehydration or possibly just the effort to move around with pregnancy.  Your numbers and your heart rate were perfect upon recheck.  How do you feel?  Any palpitations?  Dizziness?    Alisia Chen MD

## 2018-01-08 ENCOUNTER — HOSPITAL ENCOUNTER (OUTPATIENT)
Facility: CLINIC | Age: 38
End: 2018-01-08
Admitting: FAMILY MEDICINE
Payer: COMMERCIAL

## 2018-01-11 NOTE — TELEPHONE ENCOUNTER
Called patient no answer left message (consent to communicate signed)   Provided message below from Dr. Alisia Menjivar MA

## 2018-01-17 ENCOUNTER — PRENATAL OFFICE VISIT (OUTPATIENT)
Dept: FAMILY MEDICINE | Facility: CLINIC | Age: 38
End: 2018-01-17
Payer: COMMERCIAL

## 2018-01-17 VITALS
BODY MASS INDEX: 23.09 KG/M2 | HEART RATE: 92 BPM | HEIGHT: 67 IN | WEIGHT: 147.1 LBS | SYSTOLIC BLOOD PRESSURE: 104 MMHG | OXYGEN SATURATION: 96 % | TEMPERATURE: 97.9 F | DIASTOLIC BLOOD PRESSURE: 64 MMHG

## 2018-01-17 DIAGNOSIS — Z34.93 NORMAL PREGNANCY IN THIRD TRIMESTER: Primary | ICD-10-CM

## 2018-01-17 PROCEDURE — 99207 ZZC PRENATAL VISIT: CPT | Performed by: FAMILY MEDICINE

## 2018-01-17 NOTE — PROGRESS NOTES
Megan Young is a 37 year old female who presents with no concerns  Feels well  Her toddler just diagnosed with hand foot mouth - we talked about avoiding close kissing and hand washing.  She had some tachycardia at her last viist which resolved with hydration the next day - labs showed no anemia and normal lytes - reviewed increased fluids  34w3d  Denies LOF, VB CTNS  Good FM  Due for: no screens today  Anticipatory guidance reviewed  RTC 2 weeks for GBS with CNMs  SN

## 2018-01-17 NOTE — MR AVS SNAPSHOT
"              After Visit Summary   1/17/2018    Megan Young    MRN: [id number]           Patient Information     Date Of Birth          1980        Visit Information        Provider Department      1/17/2018 3:30 PM Alisia Chen MD [facility]        Today's Diagnoses     Normal pregnancy in third trimester    -  1       Follow-ups after your visit        Who to contact     If you have questions or need follow up information about today's clinic visit or your schedule please contact [facility] directly at [phone].  Normal or non-critical lab and imaging results will be communicated to you by Spitogatos.grhart, letter or phone within 4 business days after the clinic has received the results. If you do not hear from us within 7 days, please contact the clinic through Spitogatos.grhart or phone. If you have a critical or abnormal lab result, we will notify you by phone as soon as possible.  Submit refill requests through Frevvo or call your pharmacy and they will forward the refill request to us. Please allow 3 business days for your refill to be completed.          Additional Information About Your Visit        MyChart Information     Frevvo gives you secure access to your electronic health record. If you see a primary care provider, you can also send messages to your care team and make appointments. If you have questions, please call your primary care clinic.  If you do not have a primary care provider, please call [phone] and they will assist you.        Care EveryWhere ID     This is your Care EveryWhere ID. This could be used by other organizations to access your [place] medical records  [id number]        Your Vitals Were     Pulse Temperature Height Last Period Pulse Oximetry BMI (Body Mass Index)    92 97.9  F (36.6  C) (Tympanic) 5' 7\" (1.702 m) 05/21/2017 (Exact Date) 96% 23.04 kg/m2       Blood Pressure from Last 3 Encounters:   01/17/18 104/64   01/03/18 108/62 "   01/03/18 100/60    Weight from Last 3 Encounters:   01/17/18 147 lb 1.6 oz (66.7 kg)   01/03/18 145 lb 8 oz (66 kg)   12/18/17 143 lb 4.8 oz (65 kg)              Today, you had the following     No orders found for display       Primary Care Provider Office Phone # Fax #    Alisia Chen [phone]       [address] [address]        Equal Access to Services     ARTHUR ROBLES : Hadii aad ku hadasho Soomaali, waaxda luqadaha, qaybta kaalmada adeegyada, domingo bravo . So [facility] [phone].    ATENCIÓN: Si habla español, tiene a luis disposición servicios gratuitos de asistencia lingüística. Kayla al [phone].    We comply with applicable federal civil rights laws and [place] laws. We do not discriminate on the basis of race, color, national origin, age, disability, sex, sexual orientation, or gender identity.            Thank you!     Thank you for choosing [facility]  for your care. Our goal is always to provide you with excellent care. Hearing back from our patients is one way we can continue to improve our services. Please take a few minutes to complete the written survey that you may receive in the mail after your visit with us. Thank you!             Your Updated Medication List - Protect others around you: Learn how to safely use, store and throw away your medicines at www.disposemymeds.org.          This list is accurate as of: 1/17/18  4:30 PM.  Always use your most recent med list.                   Brand Name Dispense Instructions for use Diagnosis    prenatal multivitamin plus iron 27-0.8 MG Tabs per tablet      Take 1 tablet by mouth daily

## 2018-01-17 NOTE — NURSING NOTE
"Chief Complaint   Patient presents with     Prenatal Care     /64  Pulse 92  Temp 97.9  F (36.6  C) (Tympanic)  Ht 5' 7\" (1.702 m)  Wt 147 lb 1.6 oz (66.7 kg)  LMP 05/21/2017 (Exact Date)  SpO2 96%  BMI 23.04 kg/m2 Estimated body mass index is 23.04 kg/(m^2) as calculated from the following:    Height as of this encounter: 5' 7\" (1.702 m).    Weight as of this encounter: 147 lb 1.6 oz (66.7 kg).  Medication Reconciliation: complete      Health Maintenance due pending provider review:  NONE    n/a    Norma Camacho CMA  "

## 2018-01-29 ENCOUNTER — PRENATAL OFFICE VISIT (OUTPATIENT)
Dept: MIDWIFE SERVICES | Facility: CLINIC | Age: 38
End: 2018-01-29
Payer: COMMERCIAL

## 2018-01-29 VITALS
WEIGHT: 150 LBS | BODY MASS INDEX: 23.54 KG/M2 | HEART RATE: 73 BPM | TEMPERATURE: 98 F | SYSTOLIC BLOOD PRESSURE: 111 MMHG | DIASTOLIC BLOOD PRESSURE: 73 MMHG | HEIGHT: 67 IN

## 2018-01-29 DIAGNOSIS — O09.522 ELDERLY MULTIGRAVIDA IN SECOND TRIMESTER: ICD-10-CM

## 2018-01-29 DIAGNOSIS — Z34.83 ENCOUNTER FOR SUPERVISION OF OTHER NORMAL PREGNANCY IN THIRD TRIMESTER: Primary | ICD-10-CM

## 2018-01-29 PROCEDURE — 99207 ZZC PRENATAL VISIT: CPT | Performed by: ADVANCED PRACTICE MIDWIFE

## 2018-01-29 PROCEDURE — 87653 STREP B DNA AMP PROBE: CPT | Performed by: ADVANCED PRACTICE MIDWIFE

## 2018-01-29 NOTE — NURSING NOTE
"Chief Complaint   Patient presents with     Prenatal Care       Initial /73  Pulse 73  Temp 98  F (36.7  C)  Ht 5' 7\" (1.702 m)  Wt 150 lb (68 kg)  LMP 05/21/2017 (Exact Date)  BMI 23.49 kg/m2 Estimated body mass index is 23.49 kg/(m^2) as calculated from the following:    Height as of this encounter: 5' 7\" (1.702 m).    Weight as of this encounter: 150 lb (68 kg).  Medication Reconciliation: complete    "

## 2018-01-29 NOTE — PROGRESS NOTES
36w1d  Patient feeling well. Positive fetal movement. Denies water leaking, vaginal bleeding, decreased fetal movement, contraction pain, or headaches.   Doing well, no concerns today. Discussed plan for labor. Hoping to have no medications again like her first but open to nitrous as an option. Plans to breastfeed. Went well with Eyad but took about 8 weeks to get established.   Patient feels like baby is laying transverse at times and not head down. On leopolds today feels cephalic. No BSUS at [facility] so patient will make her next appointment at [place] to double check.   GBS today, hemoglobin done 1/3/18 and WNL  Danger signs reviewed, pre-eclampsia signs and symptoms discussed.   Knows when to call triage and has phone numbers.   Follow up in 1 week.   Gala Sparks CNM

## 2018-01-30 LAB
GP B STREP DNA SPEC QL NAA+PROBE: NEGATIVE
SPECIMEN SOURCE: NORMAL

## 2018-02-02 ENCOUNTER — PRENATAL OFFICE VISIT (OUTPATIENT)
Dept: MIDWIFE SERVICES | Facility: CLINIC | Age: 38
End: 2018-02-02
Payer: COMMERCIAL

## 2018-02-02 VITALS
DIASTOLIC BLOOD PRESSURE: 68 MMHG | BODY MASS INDEX: 23.65 KG/M2 | HEART RATE: 107 BPM | OXYGEN SATURATION: 98 % | WEIGHT: 151 LBS | SYSTOLIC BLOOD PRESSURE: 118 MMHG

## 2018-02-02 DIAGNOSIS — Z34.83 ENCOUNTER FOR SUPERVISION OF OTHER NORMAL PREGNANCY IN THIRD TRIMESTER: Primary | ICD-10-CM

## 2018-02-02 PROCEDURE — 99207 ZZC PRENATAL VISIT: CPT | Performed by: ADVANCED PRACTICE MIDWIFE

## 2018-02-02 NOTE — MR AVS SNAPSHOT
After Visit Summary   2/2/2018    Megan Young    MRN: [id number]           Patient Information     Date Of Birth          1980        Visit Information        Provider Department      2/2/2018 1:30 PM Ashli Pinto APRN CNM [facility]        Today's Diagnoses     Encounter for supervision of other normal pregnancy in third trimester    -  1       Follow-ups after your visit        Your next 10 appointments already scheduled     Feb 12, 2018  3:45 PM CST   [facility] Established Prenatal with CEM Salazar CNM   [facility]    [address] [facility] [facility] [address] [phone]            Feb 19, 2018  3:45 PM CST   [facility] Established Prenatal with CEM Salazar CNM   [facility]    [address] [address] [phone]              Who to contact     If you have questions or need follow up information about today's clinic visit or your schedule please contact [facility] directly at [phone].  Normal or non-critical lab and imaging results will be communicated to you by MyChart, letter or phone within 4 business days after the clinic has received the results. If you do not hear from us within 7 days, please contact the clinic through Tapestryhart or phone. If you have a critical or abnormal lab result, we will notify you by phone as soon as possible.  Submit refill requests through Comp[employer] Kinetics or call your pharmacy and they will forward the refill request to us. Please allow 3 business days for your refill to be completed.          Additional Information About Your Visit        MyChart Information     Compression Kinetics gives you secure access to your electronic health record. If you see a primary care provider, you can also send messages to your care team and make appointments. If you have questions, please call your  primary care clinic.  If you do not have a primary care provider, please call [phone] and they will assist you.        Care EveryWhere ID     This is your Care EveryWhere ID. This could be used by other organizations to access your [place] medical records  [id number]        Your Vitals Were     Pulse Last Period Pulse Oximetry BMI (Body Mass Index)          107 05/21/2017 (Exact Date) 98% 23.65 kg/m2         Blood Pressure from Last 3 Encounters:   02/02/18 118/68   01/29/18 111/73   01/17/18 104/64    Weight from Last 3 Encounters:   02/02/18 151 lb (68.5 kg)   01/29/18 150 lb (68 kg)   01/17/18 147 lb 1.6 oz (66.7 kg)              Today, you had the following     No orders found for display       Primary Care Provider Office Phone # Fax #    Alisia Chen [phone] [address] [address]        Equal Access to Services     [facility] : Hadii aad ku hadasho Soomaali, waaxda luqadaha, qaybta kaalmada adeegyada, domingo montgomery hayyumiko bravo . So [facility] [phone].    ATENCIÓN: Si habla español, tiene a luis disposición servicios gratuitos de asistencia lingüística. Llame al [phone].    We comply with applicable federal civil rights laws and [place] laws. We do not discriminate on the basis of race, color, national origin, age, disability, sex, sexual orientation, or gender identity.            Thank you!     Thank you for choosing [facility]  for your care. Our goal is always to provide you with excellent care. Hearing back from our patients is one way we can continue to improve our services. Please take a few minutes to complete the written survey that you may receive in the mail after your visit with us. Thank you!             Your Updated Medication List - Protect others around you: Learn how to safely use, store and throw away your medicines at www.disposemymeds.org.          This list is accurate as of 2/2/18   4:08 PM.  Always use your most recent med list.                   Brand Name Dispense Instructions for use Diagnosis    prenatal multivitamin plus iron 27-0.8 MG Tabs per tablet      Take 1 tablet by mouth daily

## 2018-02-02 NOTE — PROGRESS NOTES
36w5d  Feeling well. Reports good fetal movement. Denies leaking of fluid, vaginal bleeding, regular uterine contractions, headache or other concerns.  Cephalic position confirmed by bSUS. RTC in 1 wk DANIELLA

## 2018-02-12 ENCOUNTER — PRENATAL OFFICE VISIT (OUTPATIENT)
Dept: MIDWIFE SERVICES | Facility: CLINIC | Age: 38
End: 2018-02-12
Payer: COMMERCIAL

## 2018-02-12 VITALS
WEIGHT: 154.4 LBS | BODY MASS INDEX: 24.23 KG/M2 | SYSTOLIC BLOOD PRESSURE: 123 MMHG | HEIGHT: 67 IN | TEMPERATURE: 97.5 F | DIASTOLIC BLOOD PRESSURE: 79 MMHG | HEART RATE: 75 BPM

## 2018-02-12 DIAGNOSIS — O09.523 ELDERLY MULTIGRAVIDA IN THIRD TRIMESTER: Primary | ICD-10-CM

## 2018-02-12 PROCEDURE — 59426 ANTEPARTUM CARE ONLY: CPT | Performed by: ADVANCED PRACTICE MIDWIFE

## 2018-02-12 PROCEDURE — 99207 ZZC PRENATAL VISIT: CPT | Performed by: ADVANCED PRACTICE MIDWIFE

## 2018-02-12 NOTE — NURSING NOTE
"Chief Complaint   Patient presents with     Prenatal Care       Initial /79  Pulse 75  Temp 97.5  F (36.4  C)  Ht 5' 7\" (1.702 m)  Wt 154 lb 6.4 oz (70 kg)  LMP 05/21/2017 (Exact Date)  BMI 24.18 kg/m2 Estimated body mass index is 24.18 kg/(m^2) as calculated from the following:    Height as of this encounter: 5' 7\" (1.702 m).    Weight as of this encounter: 154 lb 6.4 oz (70 kg).  Medication Reconciliation: complete    "

## 2018-02-12 NOTE — MR AVS SNAPSHOT
After Visit Summary   2/12/2018    Megan Young    MRN: [id number]           Patient Information     Date Of Birth          1980        Visit Information        Provider Department      2/12/2018 3:45 PM Gala Sparks APRN CNM [facility]        Today's Diagnoses     Elderly multigravida in third trimester    -  1       Follow-ups after your visit        Your next 10 appointments already scheduled     Feb 19, 2018  3:45 PM RENZO Damico OB-GYN Established Prenatal with CEM Salazar CNM   [facility]    [address] [facility] [address] [phone]              Who to contact     If you have questions or need follow up information about today's clinic visit or your schedule please contact [facility] directly at [phone].  Normal or non-critical lab and imaging results will be communicated to you by MyChart, letter or phone within 4 business days after the clinic has received the results. If you do not hear from us within 7 days, please contact the clinic through VictorOpshart or phone. If you have a critical or abnormal lab result, we will notify you by phone as soon as possible.  Submit refill requests through [employer] or call your pharmacy and they will forward the refill request to us. Please allow 3 business days for your refill to be completed.          Additional Information About Your Visit        MyChart Information     [employer] gives you secure access to your electronic health record. If you see a primary care provider, you can also send messages to your care team and make appointments. If you have questions, please call your primary care clinic.  If you do not have a primary care provider, please call [phone] and they will assist you.        Care EveryWhere ID     This is your Care EveryWhere ID. This could be used by other organizations to access your [facility]  "records  [id number]        Your Vitals Were     Pulse Temperature Height Last Period BMI (Body Mass Index)       75 97.5  F (36.4  C) 5' 7\" (1.702 m) 05/21/2017 (Exact Date) 24.18 kg/m2        Blood Pressure from Last 3 Encounters:   02/12/18 123/79   02/02/18 118/68   01/29/18 111/73    Weight from Last 3 Encounters:   02/12/18 154 lb 6.4 oz (70 kg)   02/02/18 151 lb (68.5 kg)   01/29/18 150 lb (68 kg)              Today, you had the following     No orders found for display       Primary Care Provider Office Phone # Fax #    Alisia Chen [phone] [address] [address]        Equal Access to Services     [facility]: Hadii nena lyncho Sosilva, waaxda luqadaha, qaybta kaalmada adeben, domingo bravo . So [facility] [phone].    ATENCIÓN: Si habla español, tiene a luis disposición servicios gratuitos de asistencia lingüística. Llame al [phone].    We comply with applicable federal civil rights laws and [place] laws. We do not discriminate on the basis of race, color, national origin, age, disability, sex, sexual orientation, or gender identity.            Thank you!     Thank you for choosing [facility]  for your care. Our goal is always to provide you with excellent care. Hearing back from our patients is one way we can continue to improve our services. Please take a few minutes to complete the written survey that you may receive in the mail after your visit with us. Thank you!             Your Updated Medication List - Protect others around you: Learn how to safely use, store and throw away your medicines at www.disposemymeds.org.          This list is accurate as of 2/12/18  4:10 PM.  Always use your most recent med list.                   Brand Name Dispense Instructions for use Diagnosis    prenatal multivitamin plus iron 27-0.8 MG Tabs per tablet      Take 1 tablet by mouth daily          "

## 2018-02-12 NOTE — PROGRESS NOTES
38w1d   Patient feeling well. Positive fetal movement. Denies water leaking, vaginal bleeding, decreased fetal movement, contraction pain, or headaches.   Discussed last labor and laceration, she was wondering about the chances of tearing like that again. It was a hard labor and hard recovery for her. Hoping this time goes better. Feeling ready to be done.   Danger signs reviewed, pre-eclampsia signs and symptoms discussed.   Knows when to call triage and has phone numbers.   Follow up in 1 week.  Gala YOU

## 2018-02-19 ENCOUNTER — PRENATAL OFFICE VISIT (OUTPATIENT)
Dept: MIDWIFE SERVICES | Facility: CLINIC | Age: 38
End: 2018-02-19
Payer: COMMERCIAL

## 2018-02-19 VITALS
HEART RATE: 77 BPM | HEIGHT: 67 IN | BODY MASS INDEX: 24.25 KG/M2 | SYSTOLIC BLOOD PRESSURE: 114 MMHG | DIASTOLIC BLOOD PRESSURE: 71 MMHG | TEMPERATURE: 98 F | WEIGHT: 154.5 LBS

## 2018-02-19 DIAGNOSIS — O09.523 ELDERLY MULTIGRAVIDA IN THIRD TRIMESTER: Primary | ICD-10-CM

## 2018-02-19 PROCEDURE — 99207 ZZC PRENATAL VISIT: CPT | Performed by: ADVANCED PRACTICE MIDWIFE

## 2018-02-19 NOTE — MR AVS SNAPSHOT
After Visit Summary   2018    Megan Young    MRN: [id number]           Patient Information     Date Of Birth          1980        Visit Information        Provider Department      2018 2:45 PM Gala Sparks APRN [facility]        Today's Diagnoses     Elderly multigravida in third trimester    -  1       Follow-ups after your visit        Future tests that were ordered for you today     Open Standing Orders        Priority Remaining Interval Expires Ordered    Oxygen: Oxygen mask STAT 53077/87058 PRN  2018    CBC with platelets differential STAT  CONDITIONAL X 1 STAT  2018    ABO/Rh type and screen STAT  CONDITIONAL X 1 STAT  2018    Fibrinogen activity STAT  CONDITIONAL X 1 STAT  2018    D dimer quantitative STAT  CONDITIONAL X 1 STAT  2018    INR STAT  CONDITIONAL X 1 STAT  2018    Partial thromboplastin time STAT  CONDITIONAL X 1 STAT  2018    Home Care Post Partum/ IP Consult Routine  CONDITIONAL X 1  2018    Lactation IP Consult Routine  CONDITIONAL X 1  2018    Vital signs - IF Postpartum Hemorrhage Routine 86821/92009 PRN  2018    Activity: Bedrest Strict Routine 75468/32657 PRN  2018    Santos catheter - IF Postpartum Hemorrhage Routine 78169/97640 PRN  2018    Intake and output - IF Postpartum Hemorrhage Routine 23894/03683 PRN  2018    Pulse oximetry nursing - IF Postpartum Hemorrhage Routine 44689/26971 PRN  2018    IV access - IF Postpartum Hemorrhage Routine 59446/63406 PRN  2018    Document - IF Postpartum Hemorrhage Routine 80880/54178 PRN  2018    Pulse oximetry nursing Routine 23083/46071 PRN  2018    Skin care Routine 98044/12652 PRN  2018    Ice to affected area Routine 07719/96971 PRN  2018    Perineal skin care Routine 21702/67880 PRN  2018    Abdominal binder Routine 50367/33003 PRN  2018    Salma HONEYCUTT  "heating pad Routine 1/1 CONDITIONAL X 1  2/22/2018    Breast pump Routine 37333/04523 PRN  2/22/2018    Activity: Up ad ashwin Routine 23729/15360 PRN  2/22/2018    Shower Routine 31858/08996 PRN  2/22/2018            Who to contact     If you have questions or need follow up information about today's clinic visit or your schedule please contact [facility] directly at [phone].  Normal or non-critical lab and imaging results will be communicated to you by Loterityhart, letter or phone within 4 business days after the clinic has received the results. If you do not hear from us within 7 days, please contact the clinic through Mingglt or phone. If you have a critical or abnormal lab result, we will notify you by phone as soon as possible.  Submit refill requests through Liberata or call your pharmacy and they will forward the refill request to us. Please allow 3 business days for your refill to be completed.          Additional Information About Your Visit        Loterityhart Information     Liberata gives you secure access to your electronic health record. If you see a primary care provider, you can also send messages to your care team and make appointments. If you have questions, please call your primary care clinic.  If you do not have a primary care provider, please call [phone] and they will assist you.        Care EveryWhere ID     This is your Care EveryWhere ID. This could be used by other organizations to access your [place] medical records  [id number]        Your Vitals Were     Pulse Temperature Height Last Period BMI (Body Mass Index)       77 98  F (36.7  C) 5' 7\" (1.702 m) 05/21/2017 (Exact Date) 24.2 kg/m2        Blood Pressure from Last 3 Encounters:   02/23/18 107/78   02/19/18 114/71   02/12/18 123/79    Weight from Last 3 Encounters:   02/22/18 154 lb (69.9 kg)   02/19/18 154 lb 8 oz (70.1 kg)   02/12/18 154 lb 6.4 oz (70 kg)              Today, you had the following     No orders found for " display       Primary Care Provider Office Phone # Fax #    MiltonErnestina Chen [phone] [address] [address]        Equal Access to Services     ARTHUR ROBLES : Hadii aad ku hadmatt Sosilva, waaxda luqadaha, qaybta kaalmada adesarahda, domingo rejiin hayaajorge murciabelkys vaughnfátima osborne. So [facility] [phone].    ATENCIÓN: Si habla español, tiene a luis disposición servicios gratuitos de asistencia lingüística. Llame al [phone].    We comply with applicable federal civil rights laws and [place] laws. We do not discriminate on the basis of race, color, national origin, age, disability, sex, sexual orientation, or gender identity.            Thank you!     [facility]  [facility]. Our goal is always to provide you with excellent care. Hearing back from our patients is one way we can continue to improve our services. Please take a few minutes to complete the written survey that you may receive in the mail after your visit with us. Thank you!             Your Updated Medication List - Protect others around you: Learn how to safely use, store and throw away your medicines at www.disposemymeds.org.          This list is accurate as of 2/19/18 11:59 PM.  Always use your most recent med list.                   Brand Name Dispense Instructions for use Diagnosis    prenatal multivitamin plus iron 27-0.8 MG Tabs per tablet      Take 1 tablet by mouth daily

## 2018-02-19 NOTE — PROGRESS NOTES
39w1d   Patient feeling well. Positive fetal movement. Denies water leaking, vaginal bleeding, decreased fetal movement, contraction pain, or headaches.   Danger signs reviewed, pre-eclampsia signs and symptoms discussed.   Knows when to call triage and has phone numbers.   Follow up in 1 week.   Gala Sparks CNM

## 2018-02-19 NOTE — NURSING NOTE
"No chief complaint on file.      Initial /71  Pulse 77  Temp 98  F (36.7  C)  Ht 5' 7\" (1.702 m)  Wt 154 lb 8 oz (70.1 kg)  LMP 05/21/2017 (Exact Date)  BMI 24.2 kg/m2 Estimated body mass index is 24.2 kg/(m^2) as calculated from the following:    Height as of this encounter: 5' 7\" (1.702 m).    Weight as of this encounter: 154 lb 8 oz (70.1 kg).  Medication Reconciliation: complete    "

## 2018-02-22 ENCOUNTER — NURSE TRIAGE (OUTPATIENT)
Dept: NURSING | Facility: CLINIC | Age: 38
End: 2018-02-22

## 2018-02-22 ENCOUNTER — HOSPITAL ENCOUNTER (INPATIENT)
Facility: CLINIC | Age: 38
LOS: 2 days | Discharge: HOME HEALTH | End: 2018-02-24
Attending: ADVANCED PRACTICE MIDWIFE | Admitting: ADVANCED PRACTICE MIDWIFE
Payer: COMMERCIAL

## 2018-02-22 PROCEDURE — G0463 HOSPITAL OUTPT CLINIC VISIT: HCPCS

## 2018-02-22 PROCEDURE — 59410 OBSTETRICAL CARE: CPT | Performed by: ADVANCED PRACTICE MIDWIFE

## 2018-02-22 PROCEDURE — 72200001 ZZH LABOR CARE VAGINAL DELIVERY SINGLE

## 2018-02-22 PROCEDURE — 25000132 ZZH RX MED GY IP 250 OP 250 PS 637: Performed by: ADVANCED PRACTICE MIDWIFE

## 2018-02-22 PROCEDURE — 12000030 ZZH R&B OB INTERMEDIATE UMMC

## 2018-02-22 PROCEDURE — 25000125 ZZHC RX 250

## 2018-02-22 RX ORDER — OXYTOCIN/0.9 % SODIUM CHLORIDE 30/500 ML
340 PLASTIC BAG, INJECTION (ML) INTRAVENOUS CONTINUOUS PRN
Status: DISCONTINUED | OUTPATIENT
Start: 2018-02-22 | End: 2018-02-24 | Stop reason: HOSPADM

## 2018-02-22 RX ORDER — MISOPROSTOL 200 UG/1
400 TABLET ORAL
Status: DISCONTINUED | OUTPATIENT
Start: 2018-02-22 | End: 2018-02-24 | Stop reason: HOSPADM

## 2018-02-22 RX ORDER — OXYCODONE AND ACETAMINOPHEN 5; 325 MG/1; MG/1
1 TABLET ORAL
Status: DISCONTINUED | OUTPATIENT
Start: 2018-02-22 | End: 2018-02-22

## 2018-02-22 RX ORDER — OXYTOCIN 10 [USP'U]/ML
10 INJECTION, SOLUTION INTRAMUSCULAR; INTRAVENOUS
Status: DISCONTINUED | OUTPATIENT
Start: 2018-02-22 | End: 2018-02-22

## 2018-02-22 RX ORDER — OXYTOCIN 10 [USP'U]/ML
INJECTION, SOLUTION INTRAMUSCULAR; INTRAVENOUS
Status: DISCONTINUED
Start: 2018-02-22 | End: 2018-02-23 | Stop reason: HOSPADM

## 2018-02-22 RX ORDER — OXYTOCIN/0.9 % SODIUM CHLORIDE 30/500 ML
100-340 PLASTIC BAG, INJECTION (ML) INTRAVENOUS CONTINUOUS PRN
Status: DISCONTINUED | OUTPATIENT
Start: 2018-02-22 | End: 2018-02-22

## 2018-02-22 RX ORDER — METHYLERGONOVINE MALEATE 0.2 MG/ML
200 INJECTION INTRAVENOUS
Status: DISCONTINUED | OUTPATIENT
Start: 2018-02-22 | End: 2018-02-22

## 2018-02-22 RX ORDER — ONDANSETRON 2 MG/ML
4 INJECTION INTRAMUSCULAR; INTRAVENOUS EVERY 6 HOURS PRN
Status: DISCONTINUED | OUTPATIENT
Start: 2018-02-22 | End: 2018-02-22

## 2018-02-22 RX ORDER — AMOXICILLIN 250 MG
2 CAPSULE ORAL 2 TIMES DAILY PRN
Status: DISCONTINUED | OUTPATIENT
Start: 2018-02-22 | End: 2018-02-24 | Stop reason: HOSPADM

## 2018-02-22 RX ORDER — IBUPROFEN 800 MG/1
800 TABLET, FILM COATED ORAL
Status: COMPLETED | OUTPATIENT
Start: 2018-02-22 | End: 2018-02-22

## 2018-02-22 RX ORDER — NALOXONE HYDROCHLORIDE 0.4 MG/ML
.1-.4 INJECTION, SOLUTION INTRAMUSCULAR; INTRAVENOUS; SUBCUTANEOUS
Status: DISCONTINUED | OUTPATIENT
Start: 2018-02-22 | End: 2018-02-22

## 2018-02-22 RX ORDER — LANOLIN 100 %
OINTMENT (GRAM) TOPICAL
Status: DISCONTINUED | OUTPATIENT
Start: 2018-02-22 | End: 2018-02-24 | Stop reason: HOSPADM

## 2018-02-22 RX ORDER — LIDOCAINE HYDROCHLORIDE 10 MG/ML
INJECTION, SOLUTION EPIDURAL; INFILTRATION; INTRACAUDAL; PERINEURAL
Status: COMPLETED
Start: 2018-02-22 | End: 2018-02-22

## 2018-02-22 RX ORDER — BISACODYL 10 MG
10 SUPPOSITORY, RECTAL RECTAL DAILY PRN
Status: DISCONTINUED | OUTPATIENT
Start: 2018-02-24 | End: 2018-02-24 | Stop reason: HOSPADM

## 2018-02-22 RX ORDER — ACETAMINOPHEN 325 MG/1
650 TABLET ORAL EVERY 4 HOURS PRN
Status: DISCONTINUED | OUTPATIENT
Start: 2018-02-22 | End: 2018-02-24 | Stop reason: HOSPADM

## 2018-02-22 RX ORDER — IBUPROFEN 800 MG/1
800 TABLET, FILM COATED ORAL EVERY 6 HOURS PRN
Status: DISCONTINUED | OUTPATIENT
Start: 2018-02-22 | End: 2018-02-24 | Stop reason: HOSPADM

## 2018-02-22 RX ORDER — ACETAMINOPHEN 325 MG/1
650 TABLET ORAL EVERY 4 HOURS PRN
Status: DISCONTINUED | OUTPATIENT
Start: 2018-02-22 | End: 2018-02-22

## 2018-02-22 RX ORDER — OXYTOCIN/0.9 % SODIUM CHLORIDE 30/500 ML
100 PLASTIC BAG, INJECTION (ML) INTRAVENOUS CONTINUOUS
Status: DISCONTINUED | OUTPATIENT
Start: 2018-02-22 | End: 2018-02-24 | Stop reason: HOSPADM

## 2018-02-22 RX ORDER — OXYTOCIN 10 [USP'U]/ML
10 INJECTION, SOLUTION INTRAMUSCULAR; INTRAVENOUS
Status: DISCONTINUED | OUTPATIENT
Start: 2018-02-22 | End: 2018-02-24 | Stop reason: HOSPADM

## 2018-02-22 RX ORDER — OXYTOCIN/0.9 % SODIUM CHLORIDE 30/500 ML
PLASTIC BAG, INJECTION (ML) INTRAVENOUS
Status: DISCONTINUED
Start: 2018-02-22 | End: 2018-02-23 | Stop reason: HOSPADM

## 2018-02-22 RX ORDER — OXYCODONE HYDROCHLORIDE 5 MG/1
5 TABLET ORAL EVERY 4 HOURS PRN
Status: DISCONTINUED | OUTPATIENT
Start: 2018-02-22 | End: 2018-02-24 | Stop reason: HOSPADM

## 2018-02-22 RX ORDER — MISOPROSTOL 200 UG/1
TABLET ORAL
Status: DISCONTINUED
Start: 2018-02-22 | End: 2018-02-23 | Stop reason: HOSPADM

## 2018-02-22 RX ORDER — NALOXONE HYDROCHLORIDE 0.4 MG/ML
.1-.4 INJECTION, SOLUTION INTRAMUSCULAR; INTRAVENOUS; SUBCUTANEOUS
Status: DISCONTINUED | OUTPATIENT
Start: 2018-02-22 | End: 2018-02-24 | Stop reason: HOSPADM

## 2018-02-22 RX ORDER — SODIUM CHLORIDE, SODIUM LACTATE, POTASSIUM CHLORIDE, CALCIUM CHLORIDE 600; 310; 30; 20 MG/100ML; MG/100ML; MG/100ML; MG/100ML
INJECTION, SOLUTION INTRAVENOUS CONTINUOUS
Status: DISCONTINUED | OUTPATIENT
Start: 2018-02-22 | End: 2018-02-22

## 2018-02-22 RX ORDER — CARBOPROST TROMETHAMINE 250 UG/ML
250 INJECTION, SOLUTION INTRAMUSCULAR
Status: DISCONTINUED | OUTPATIENT
Start: 2018-02-22 | End: 2018-02-22

## 2018-02-22 RX ORDER — HYDROCORTISONE 2.5 %
CREAM (GRAM) TOPICAL 3 TIMES DAILY PRN
Status: DISCONTINUED | OUTPATIENT
Start: 2018-02-22 | End: 2018-02-24 | Stop reason: HOSPADM

## 2018-02-22 RX ORDER — AMOXICILLIN 250 MG
1 CAPSULE ORAL 2 TIMES DAILY PRN
Status: DISCONTINUED | OUTPATIENT
Start: 2018-02-22 | End: 2018-02-24 | Stop reason: HOSPADM

## 2018-02-22 RX ADMIN — IBUPROFEN 800 MG: 800 TABLET, FILM COATED ORAL at 21:24

## 2018-02-22 RX ADMIN — LIDOCAINE HYDROCHLORIDE 300 MG: 10 INJECTION, SOLUTION EPIDURAL; INFILTRATION; INTRACAUDAL; PERINEURAL at 21:08

## 2018-02-22 NOTE — IP AVS SNAPSHOT
MRN:[id number]                      After Visit Summary   2/22/2018    Megan Young    MRN: [id number]           Thank you!     Thank you for choosing [facility] for your care. Our goal is always to provide you with excellent care. Hearing back from our patients is one way we can continue to improve our services. Please take a few minutes to complete the written survey that you may receive in the mail after you visit with us. Thank you!        Patient Information     Date Of Birth          1980        Designated Caregiver       Most Recent Value    Caregiver    Will someone help with your care after discharge? no      About your hospital stay     You were admitted on:  February 22, 2018 You last received care in the:  [facility]    You were discharged on:  February 24, 2018       Who to Call     For medical emergencies, please call 911.  For non-urgent questions about your medical care, please call your primary care provider or clinic, [phone]          Attending Provider     Provider Specialty    Drew Estarda APRN CNM MIDWIFE       Primary Care Provider Office Phone # Fax #    Alisia Chen [phone]      Further instructions from your care team       Postpartum Vaginal Delivery Instructions    Activity       Ask family and friends for help when you need it.    Do not place anything in your vagina for 6 weeks.    You are not restricted on other activities, but take it easy for a few weeks to allow your body to recover from delivery.  You are able to do any activities you feel up to that point.    No driving until you have stopped taking your pain medications (usually two weeks after delivery).     Call your health care provider if you have any of these symptoms:       Increased pain, swelling, redness, or fluid around your stiches from an episiotomy or perineal tear.    A fever above 100.4 F (38 C) with or without chills when placing a thermometer under your  "tongue.    You soak a sanitary pad with blood within 1 hour, or you see blood clots larger than a golf ball.    Bleeding that lasts more than 6 weeks.    Vaginal discharge that smells bad.    Severe pain, cramping or tenderness in your lower belly area.    A need to urinate more frequently (use the toilet more often), more urgently (use the toilet very quickly), or it burns when you urinate.    Nausea and vomiting.    Redness, swelling or pain around a vein in your leg.    Problems breastfeeding or a red or painful area on your breast.    Chest pain and cough or are gasping for air.    Problems coping with sadness, anxiety, or depression.  If you have any concerns about hurting yourself or the baby, call your provider immediately.     You have questions or concerns after you return home.     Keep your hands clean:  Always wash your hands before touching your perineal area and stitches.  This helps reduce your risk of infection.  If your hands aren't dirty, you may use an alcohol hand-rub to clean your hands. Keep your nails clean and short.        Pending Results     No orders found from 2/20/2018 to 2/23/2018.            Statement of Approval     Ordered          02/24/18 1107  I have reviewed and agree with all the recommendations and orders detailed in this document.  EFFECTIVE NOW     Approved and electronically signed by:  Fely Serrano APRN CNM             Admission Information     Date & Time Provider Department Dept. Phone    2/22/2018 Drew Estrada APRN CNM [facility] [phone]      Your Vitals Were     Blood Pressure Pulse Temperature Respirations Height Weight    105/68 (BP Location: Left arm) 74 97.7  F (36.5  C) (Oral) 18 1.702 m (5' 7\") 69.9 kg (154 lb)    Last Period Pulse Oximetry BMI (Body Mass Index)             05/21/2017 (Exact Date) 98% 24.12 kg/m2         MyChart Information     [employer] gives you secure access to your electronic health record. If you see a primary care provider, you can " also send messages to your care team and make appointments. If you have questions, please call your primary care clinic.  If you do not have a primary care provider, please call [phone] and they will assist you.        Care EveryWhere ID     This is your Care EveryWhere ID. This could be used by other organizations to access your [facility] medical records  [id number]        Equal Access to Services     [facility] MARGARET : Hadii nena watts hadasho Sobessali, waaxda luqadaha, qaybta kaalmada adebelkysyada, domingo bravo . So [facility] [phone].    ATENCIÓN: Si habla español, tiene a luis disposición servicios gratuitos de asistencia lingüística. Kayla al [phone].    We comply with applicable federal civil rights laws and [place] laws. We do not discriminate on the basis of race, color, national origin, age, disability, sex, sexual orientation, or gender identity.               Review of your medicines      UNREVIEWED medicines. Ask your doctor about these medicines        Dose / Directions    prenatal multivitamin plus iron 27-0.8 MG Tabs per tablet   Indication:  Pregnancy        Dose:  1 tablet   Take 1 tablet by mouth daily   Refills:  0                Protect others around you: Learn how to safely use, store and throw away your medicines at www.disposemymeds.org.             Medication List: This is a list of all your medications and when to take them. Check marks below indicate your daily home schedule. Keep this list as a reference.      Medications           Morning Afternoon Evening Bedtime As Needed    prenatal multivitamin plus iron 27-0.8 MG Tabs per tablet   Take 1 tablet by mouth daily

## 2018-02-22 NOTE — IP AVS SNAPSHOT
[facility]    [address] [address]    Phone:  [phone]                                       After Visit Summary   2/22/2018    Megan Young    MRN: [id number]           After Visit Summary Signature Page     I have received my discharge instructions, and my questions have been answered. I have discussed any challenges I see with this plan with the nurse or doctor.    ..........................................................................................................................................  Patient/Patient Representative Signature      ..........................................................................................................................................  Patient Representative Print Name and Relationship to Patient    ..................................................               ................................................  Date                                            Time    ..........................................................................................................................................  Reviewed by Signature/Title    ...................................................              ..............................................  Date                                                            Time

## 2018-02-23 PROCEDURE — 25000132 ZZH RX MED GY IP 250 OP 250 PS 637: Performed by: ADVANCED PRACTICE MIDWIFE

## 2018-02-23 PROCEDURE — 12000030 ZZH R&B OB INTERMEDIATE UMMC

## 2018-02-23 RX ADMIN — IBUPROFEN 800 MG: 800 TABLET ORAL at 16:27

## 2018-02-23 RX ADMIN — IBUPROFEN 800 MG: 800 TABLET ORAL at 03:30

## 2018-02-23 RX ADMIN — ACETAMINOPHEN 650 MG: 325 TABLET, FILM COATED ORAL at 20:38

## 2018-02-23 RX ADMIN — SENNOSIDES AND DOCUSATE SODIUM 1 TABLET: 8.6; 5 TABLET ORAL at 20:39

## 2018-02-23 RX ADMIN — SENNOSIDES AND DOCUSATE SODIUM 1 TABLET: 8.6; 5 TABLET ORAL at 08:00

## 2018-02-23 RX ADMIN — IBUPROFEN 800 MG: 800 TABLET ORAL at 09:38

## 2018-02-23 NOTE — PROGRESS NOTES
"Post Partum Note  Day #1 (12 hours)   SIGNIFICANT PROBLEMS:  Patient Active Problem List    Diagnosis Date Noted     Labor and delivery, indication for care 2018     Priority: Medium     Labor and delivery indication for care or intervention 2018     Priority: Medium      (normal spontaneous vaginal delivery) 2018     Priority: Medium     18 at 2103  Girl  2nd degree laceration.         Elderly multigravida in second trimester 2017     Priority: Medium      Ankit.                 Est. CNM patient.   17 MFM U/S;  Posterior placenta, nl fetal survey, YURI 18 consistant with dates.   17 1 hour GCT;  156  17 3 hour GTT:  64/133/135/109  Passed all.          INTERVAL HISTORY:  /78  Temp 98.2  F (36.8  C) (Oral)  Resp 16  Ht 1.702 m (5' 7\")  Wt 69.9 kg (154 lb)  LMP 2017 (Exact Date)  Breastfeeding? Unknown  BMI 24.12 kg/m2  Pt stable, baby is rooming in.   Breast feeding status:initiated  Complications since 2 hours post delivery: None  Patient is tolerating activity well, Voiding without difficulty, cramping is relieved by Ibuprophen, lochia is decreasing and patient denies clots.  Perineal pain is is relieved by Ibuprophen.  The perineum laceration is well approximated    Postpartum hemoglobin   Hemoglobin   Date Value Ref Range Status   2018 11.8 11.7 - 15.7 g/dL Final     Blood type   Lab Results   Component Value Date    ABO A 08/10/2017       Lab Results   Component Value Date    RH  Pos 08/10/2017     Rubella status   Lab Results   Component Value Date    RUQIGG 60 08/10/2017     Rubella: immune  History of depression: none . Postpartum depression warning signs reviewed.    ASSESSMENT/PLAN:  Normal postpartum exam , Stable Post-partum day #1  Complications:none  Plan d/c home as scheduled. Home Visit Ordered- No  RTC 6 weeks  Postpartum warning s/s reviewed, including bleeding/clots, fever, mastitis, or depression  Waqar/ " nadeem  Continue prenatal vitamins  Birthcontrol planned:did not discuss today.   Current Discharge Medication List      CONTINUE these medications which have NOT CHANGED    Details   Prenatal Vit-Fe Fumarate-FA (PRENATAL MULTIVITAMIN  PLUS IRON) 27-0.8 MG TABS Take 1 tablet by mouth daily           Patient tired, discussed rapid birth, infant 2 lbs bigger had  Difficulties with last infant breastfeeding and hoping this infant better.   May need support.   CEM [place] CNM

## 2018-02-23 NOTE — H&P
ADMISSION NOTE FOR Megan Young on 2018.    Megan Young is a 37 year old female     Woman.    HPI:  Megan Young had called at 1900 in labor at home every 7 min apart.  Discussed coming in when more painful.    Call that ROM and on way to hospital at 2020 and urge to push.  Patient was meet at front door with wheel chair and BOA pack.  Assisted to wheel chair and to Room 444.  Urge to push on arrival and placed in bed.  Initial push with head starting to crown.    No pain meds available .  Assisted with push but pt pushed based on her own sense of pressure and had  at 2103.    See Delivery summary.        Estimated Date of Delivery: Data Unavailable is calculated from Patient's last menstrual period was 2017 (exact date). [facility] at 20:48 PM  in transition labor.     Membranes are ruptured since  by patient report.     Labor start time 1700.     PRENATAL COURSE   Prenatal care began at 11 wks gestation for a total of 12 prenatal visit.  Total wt gain 31#   Prenatal vital signs WNL   Prenatal course was   complicated by    Patient Active Problem List    Diagnosis Date Noted     Labor and delivery, indication for care 2018     Priority: Medium     Labor and delivery indication for care or intervention 2018     Priority: Medium     Elderly multigravida in second trimester 2017     Priority: Medium      Ankit.                 Est. CNM patient.   17 MFM U/S;  Posterior placenta, nl fetal survey, YURI 18 consistant with dates.   17 1 hour GCT;  156  17 3 hour GTT:  64/133/135/109  Passed all.           HISTORIES   No Known Allergies   Past Medical History:   Diagnosis Date     NO ACTIVE PROBLEMS       Past Surgical History:   Procedure Laterality Date     HC TOOTH EXTRACTION W/FORCEP      wisdom      Family History   Problem Relation Age of Onset     Neurologic Disorder Mother      migraines      CANCER Father      kidney age 48     Hypertension Maternal Grandmother      Lipids Maternal Grandfather      Hypertension Maternal Grandfather      CEREBROVASCULAR DISEASE Maternal Grandfather      Alzheimer Disease Maternal Grandfather      early dementia     Lipids Paternal Grandmother       Social History   Substance Use Topics     Smoking status: Never Smoker     Smokeless tobacco: Never Used     Alcohol use 0.6 oz/week     1 Glasses of wine per week      Comment: not while pregnant      Obstetric History       T2      L2     SAB0   TAB0   Ectopic0   Multiple0   Live Births2       # Outcome Date GA Lbr Candelario/2nd Weight Sex Delivery Anes PTL Lv   2 Term 18 39w4d 03:48 / 00:15  F   N SHANNAN      Name: NANCYBABYCeasar GIOVANI      Apgar1:  8                Apgar5: 9   1 Term 11/14/15 39w5d 03:55 / 00:36 2.807 kg (6 lb 3 oz) M Vag-Spont Local N SHANNAN      Apgar1:  7                Apgar5: 9           LABS:     Lab Results   Component Value Date    ABO A 08/10/2017           Lab Results   Component Value Date    RH  Pos 08/10/2017      Rhogam not indicated   No results found for: RUBELLAABIGG   No results found for: RPR   No results found for: HIV   Lab Results   Component Value Date    HGB 11.8 2018      Lab Results   Component Value Date    HEPBANG Nonreactive 08/10/2017      Lab Results   Component Value Date    GBS Negative 2018      Other significant lab:    None.     ROS   Review Of Systems  Skin: negative  Eyes: negative  Ears/Nose/Throat: negative  Respiratory: No shortness of breath, dyspnea on exertion, cough, or hemoptysis  Cardiovascular: negative  Gastrointestinal: negative  Genitourinary: negative  Musculoskeletal: negative  Neurologic: negative  Psychiatric: negative  Hematologic/Lymphatic/Immunologic: negative  Endocrine: negative     PHYSICAL EXAM:   Vital signs stable, afebrile and BP is   BP Readings from Last 3 Encounters:   18 156/76   18 114/71   18  123/79      General appearance healthy, alert, active, moderate distress and cooperative   Heart: RRR without murmur   Lungs: clear to auscultation   Neuro: denies headache and visual disturbances   Psych: Mentation normal and bright   Legs: 2+/2+, no clonus, no edema        Abdomen: gravid, single vertex fetus, non-tender, EFW 7 lbs 5 .   Pt is nahid every 3 minutes, lasting 60 seconds and palpates strong     FETAL HEART TONES: baseline 140 with minimal monitoring due to 2nd stage of labor soon after arrivial .      PELVIC EXAM: 10/ 100/ Anterior/ soft/ +3   BLOODY SHOW:: yes    Membranes as listed above.     ASSESSMENT:   IUP @ 39w4d in transition labor.  NST not done.    Maternal status is stable.   No diagnosis found.      PLAN:   Admit - see IP orders  Anticipate  soon after arrival.              Drew Estrada CNM

## 2018-02-23 NOTE — TELEPHONE ENCOUNTER
"Patient is 40 weeks pregnant. YURI 18. Reports having contractions every 7 minutes and this has been going on for 90 minutes. Estimates that contraction last for 20 seconds. No gush of fluid from vagina. Has had spotting of blood. Patient sees a midwife for prenatal care.    7:05PM  Nurse midwife, Drew Estrada, was paged by the Smart Web  to call the patient back directly at [phone]. The patient had been told to call back to FNA if she does not hear back from the nurse midwife within 20 minutes.     Carmen Ventura RN/NIKKI    Reason for Disposition    [1] History of prior delivery (multipara) AND [2] contractions < 10 minutes apart AND [3] present 1 hour    Additional Information    Negative: Passed out (i.e., lost consciousness, collapsed and was not responding)    Negative: Shock suspected (e.g., cold/pale/clammy skin, too weak to stand, low BP, rapid pulse)    Negative: Difficult to awaken or acting confused  (e.g., disoriented, slurred speech)    Negative: [1] SEVERE abdominal pain (e.g., excruciating) AND [2] constant AND [3] present > 1 hour    Negative: Severe bleeding (e.g., continuous red blood from vagina, or large blood clots)    Negative: Umbilical cord hanging out of the vagina (shiny, white, curled appearance, \"like telephone cord\")    Negative: Uncontrollable urge to push (i.e., feels like baby is coming out now)    Negative: Can see baby    Negative: Sounds like a life-threatening emergency to the triager    Negative: Pregnant < 37 weeks (i.e., )    Negative: [1] Uncertain delivery date AND [2] possibly pregnant < 37 weeks (i.e., )    Negative: [1] First baby (primipara) AND [2] contractions < 6 minutes apart  AND [3] present 2 hours    Protocols used: PREGNANCY - LABOR-ADULT-AH    "

## 2018-02-23 NOTE — PLAN OF CARE
Problem: Patient Care Overview  Goal: Plan of Care/Patient Progress Review  Outcome: Therapy, progress toward functional goals as expected  Data: Vital signs within normal limits. Postpartum checks within normal limits - patient did not receive pitocin after delivery but has been firm with scant bleeding, no clots. Patient eating and drinking normally. Patient able to empty bladder independently and is up ambulating. No apparent signs of infection. Using tucks pads on perineum Patient performing self cares and is able to care for infant. Breastfeeding infant with some assist with latching- breastfeeding first baby was not a good experience for mother, really appreciates all tips and help with latching/ breastfeeding holds. Plan for lactation to visit tomorrow to help continue making breastfeeding this baby a positive experience. Baby has been feeding well thus far. Mother has smoother nipples that do become everted with stimulation, able to hand express independently.   Action: Patient medicated during the shift for pain. See MAR. Patient reassessed within 1 hour after each medication and pain was improved - patient stated she was comfortable. Patient oriented to room, GWN videos assigned, discussed birth certificate and [place]. Provided education on safe sleep for baby, bulb syringe suction,  feeding cues, skin to skin, breastfeeding holds and latches and hand expression. . See flow record.  Response: Positive attachment behaviors observed with infant. Support persons Ankit present.   Plan: continue plan of care.

## 2018-02-23 NOTE — PLAN OF CARE
Problem: Patient Care Overview  Goal: Plan of Care/Patient Progress Review  Outcome: Improving  Uneventful recovery after vaginal delivery. VSS. Pt reports she is comfortable after ibuprofen. Used nitrous for 2nd degree repair- effective. Uterus firm, midline, at the umbilicus. Minimal swelling to perineum- ice pack on. See flow sheet for total QBL. Ambulated to bathroom with SBA. Unable to void. Will try again later as long as uterus/bleeding remains stable. Bonding well with baby. Requesting assistance with breast feeding.  at bedside and supportive.

## 2018-02-23 NOTE — PLAN OF CARE
Patient arrived to [facility] unit via wheelchair at 0000,with belongings, accompanied by spouse/ significant other, with infant in arms. Received report from Nidhi LOUISE RN and checked bands. Unit and room orientation completd. Call light given and within arms reach; no concerns present at this time. Continue with plan of care.

## 2018-02-23 NOTE — PLAN OF CARE
Problem: Patient Care Overview  Goal: Plan of Care/Patient Progress Review  Outcome: Improving  Data: Megan Young transferred to 7123 via wheelchair at 0045. Baby transferred via parent's arms.  Action: Receiving unit notified of transfer: Yes. Patient and family notified of room change. Report given to Angelika RN at bedside. Belongings sent to receiving unit. Accompanied by Registered Nurse. Oriented patient to surroundings. Call light within reach. ID bands double-checked with receiving RN.  Response: Patient tolerated transfer and is stable.

## 2018-02-23 NOTE — L&D DELIVERY NOTE
Delivery Note for Megan Young     IUP at 39 weeks 4 days gestation delivered on 18 at 2103.     delivery of a viable  pounds  ounces Female infant.  Apgars of 8 at 1 minute and 9 at 5 minutes.  Labor was spontaneous.  Medications administered  in labor:  Pain Rx none; Antibiotics No; Other Nitrous oxide for repair.    Perineum: 2nd degree  Placenta-mechanism: spontaneous, intact,  with a 3 vessel cord.  Estimated Blood Loss was 347.  Complications of regency, labor and delivery: None  Birth attendants: Drew Estrada CNM

## 2018-02-23 NOTE — PLAN OF CARE
Problem: Patient Care Overview  Goal: Plan of Care/Patient Progress Review  Outcome: Improving  Pt becoming more independent with breastfeeding. Pt c/o will discomfort and uterine cramping. She is taking Ibuprofen and using tub soaks and tucks pads and these interventions are relieving her discomfort.

## 2018-02-23 NOTE — TELEPHONE ENCOUNTER
"  Reason for Disposition    Leakage of fluid from vagina     \"I am in labor. I just spoke with the midwife (see epic)/ My water just broke and my contractions are now 5 minutes apart.\" denies other sx. Triaged and paged on call Drew Louie(RD midwife group) at 8:27pm to call patient at [phone]. Patient is on her way in now.    Additional Information    Negative: [1] SEVERE abdominal pain (e.g., excruciating) AND [2] constant AND [3] present > 1 hour    Negative: Severe bleeding (e.g., continuous red blood from vagina, or large blood clots)    Negative: Umbilical cord hanging out of the vagina (shiny, white, curled appearance, \"like telephone cord\")    Negative: Uncontrollable urge to push (i.e., feels like baby is coming out now)    Negative: Can see baby    Negative: Sounds like a life-threatening emergency to the triager    Negative: < 20 weeks pregnant    Negative: Vaginal bleeding    Protocols used: PREGNANCY - RUPTURE OF MEMBRANES-ADULT-    "

## 2018-02-23 NOTE — PLAN OF CARE
Data: Patient presented to [facility] at .   Reason for maternal/fetal assessment per patient is labor.  Patient is a . Prenatal record reviewed.      Obstetric History       T1      L1     SAB0   TAB0   Ectopic0   Multiple0   Live Births1       # Outcome Date GA Lbr Candelario/2nd Weight Sex Delivery Anes PTL Lv   2 Current            1 Term 11/14/15 39w5d 03:55 / 00:36 2.807 kg (6 lb 3 oz) M Vag-Spont Local N SHANNAN      Apgar1:  7                Apgar5: 9      . Medical history:   Past Medical History:   Diagnosis Date     NO ACTIVE PROBLEMS    . Gestational Age 39w4d. VSS. Fetal movement present. Patient denies backache, UTI symptoms, GI problems, edema, headache, visual disturbances, epigastric or URQ pain. Support persons Ankit present.  Action: Verbal consent for EFM. Triage assessment completed. EFM applied for fetal assessment in labor. Uterine assessment contx every 2-3, strong. Fetal assessment: Presumed adequate fetal oxygenation documented (see flow record).   Response: RADHA Estrada CNM at bedside. Plan per provider is . Patient verbalized agreement with plan.

## 2018-02-23 NOTE — PLAN OF CARE
Problem: Patient Care Overview  Goal: Plan of Care/Patient Progress Review  Outcome: Improving  Pt called labor unit from the car reporting feeling urge to push. Upon arrival to labor room, Pt continued feeling need to push and began pushing.  baby girl at 2103 w/J. [place] CNM in attendance. Anticipate routine recovery and transfer to [facility] when appropriate.

## 2018-02-24 VITALS
BODY MASS INDEX: 24.17 KG/M2 | SYSTOLIC BLOOD PRESSURE: 105 MMHG | WEIGHT: 154 LBS | HEIGHT: 67 IN | TEMPERATURE: 97.7 F | DIASTOLIC BLOOD PRESSURE: 68 MMHG | RESPIRATION RATE: 18 BRPM | HEART RATE: 74 BPM | OXYGEN SATURATION: 98 %

## 2018-02-24 PROCEDURE — 90715 TDAP VACCINE 7 YRS/> IM: CPT | Performed by: ADVANCED PRACTICE MIDWIFE

## 2018-02-24 PROCEDURE — 25000128 H RX IP 250 OP 636: Performed by: ADVANCED PRACTICE MIDWIFE

## 2018-02-24 PROCEDURE — 25000132 ZZH RX MED GY IP 250 OP 250 PS 637: Performed by: ADVANCED PRACTICE MIDWIFE

## 2018-02-24 RX ADMIN — CLOSTRIDIUM TETANI TOXOID ANTIGEN (FORMALDEHYDE INACTIVATED), CORYNEBACTERIUM DIPHTHERIAE TOXOID ANTIGEN (FORMALDEHYDE INACTIVATED), BORDETELLA PERTUSSIS TOXOID ANTIGEN (GLUTARALDEHYDE INACTIVATED), BORDETELLA PERTUSSIS FILAMENTOUS HEMAGGLUTININ ANTIGEN (FORMALDEHYDE INACTIVATED), BORDETELLA PERTUSSIS PERTACTIN ANTIGEN, AND BORDETELLA PERTUSSIS FIMBRIAE 2/3 ANTIGEN 0.5 ML: 5; 2; 2.5; 5; 3; 5 INJECTION, SUSPENSION INTRAMUSCULAR at 09:59

## 2018-02-24 RX ADMIN — IBUPROFEN 800 MG: 800 TABLET ORAL at 05:40

## 2018-02-24 RX ADMIN — IBUPROFEN 800 MG: 800 TABLET ORAL at 00:04

## 2018-02-24 NOTE — PLAN OF CARE
Problem: Patient Care Overview  Goal: Plan of Care/Patient Progress Review  Outcome: Adequate for Discharge Date Met: 02/24/18  Patient is discharged, Pt is adviced daily prenatal med's. Writer  read and explained discharge instructions ,Patient  verbalized understanding and signed AVS.

## 2018-02-24 NOTE — PLAN OF CARE
Problem: Postpartum (Vaginal Delivery) (Adult,Obstetrics,Pediatric)  Goal: Signs and Symptoms of Listed Potential Problems Will be Absent, Minimized or Managed (Postpartum)  Signs and symptoms of listed potential problems will be absent, minimized or managed by discharge/transition of care (reference Postpartum (Vaginal Delivery) (Adult,Obstetrics,Pediatric) CPG).   Outcome: Improving   Vital signs within normal limits. Postpartum checks within normal limits - see flow record. Patient eating and drinking normally. Patient able to empty bladder independently and is up ambulating. No apparent signs of infection.  Patient performing self cares and is able to care for infant.breastfeeding infant on demand. Bonding well with infant. Continue with plan of care.

## 2018-02-24 NOTE — PLAN OF CARE
Problem: Patient Care Overview  Goal: Plan of Care/Patient Progress Review  Outcome: Improving  Data: Vital signs and postpartum checks WDL - see flow record. Patient eating and drinking normally. Patient able to empty bladder independently and is up ambulating.  Patient performing self cares and is able to care for infant. Breastfeeding with minimal assist in doing latching and nipples are sore applying Lanolin.   Action: Patient medicated during the shift for pain with tylenol and Ibuprofen. Patient education done see education record.  Response: Positive attachment behaviors observed with infant. Support persons  present.    Plan: Continue with the plan of care and notify provider if decline in status. Will continue to monitor and assess.

## 2018-02-24 NOTE — PROGRESS NOTES
"Post Partum Note day #2  SIGNIFICANT PROBLEMS:  Patient Active Problem List    Diagnosis Date Noted     Labor and delivery, indication for care 2018     Priority: Medium     Labor and delivery indication for care or intervention 2018     Priority: Medium      (normal spontaneous vaginal delivery) 2018     Priority: Medium     18 at 2103  Girl  2nd degree laceration.         Elderly multigravida in second trimester 2017     Priority: Medium      Ankit.                 Est. CNM patient.   17 MFM U/S;  Posterior placenta, nl fetal survey, YURI 18 consistant with dates.   17 1 hour GCT;  156  17 3 hour GTT:  64/133/135/109  Passed all.          INTERVAL HISTORY:  /68 (BP Location: Left arm)  Pulse 74  Temp 97.7  F (36.5  C) (Oral)  Resp 18  Ht 1.702 m (5' 7\")  Wt 69.9 kg (154 lb)  LMP 2017 (Exact Date)  SpO2 98%  Breastfeeding? Unknown  BMI 24.12 kg/m2  Pt stable, baby is rooming in.   Breast feeding status:initiated and well established  Complications since 2 hours post delivery: None  Patient is tolerating activity well, Voiding without difficulty, cramping is relieved by Ibuprophen, lochia is decreasing and patient denies clots.  Perineal pain is is relieved by Ibuprophen.  The perineum is intact    Postpartum hemoglobin   Hemoglobin   Date Value Ref Range Status   2018 11.8 11.7 - 15.7 g/dL Final     Blood type   Lab Results   Component Value Date    ABO A 08/10/2017       Lab Results   Component Value Date    RH  Pos 08/10/2017     Rubella status   Lab Results   Component Value Date    RUQIGG 60 08/10/2017     Rubella immune   History of depression: none . Postpartum depression warning signs reviewed.    ASSESSMENT/PLAN:  Normal postpartum exam , Stable Post-partum day #2  Complications:none  Plan d/c home today. Home Visit Ordered- No  RTC 6 weeks  Postpartum warning s/s reviewed, including bleeding/clots, fever, mastitis, or " depression  Kegels/ crjenae  Continue prenatal vitamins  Birthcontrol planned:Undecided. Fertility and contraception options reviewed.  Current Discharge Medication List      CONTINUE these medications which have NOT CHANGED    Details   Prenatal Vit-Fe Fumarate-FA (PRENATAL MULTIVITAMIN  PLUS IRON) 27-0.8 MG TABS Take 1 tablet by mouth daily         All teaching done, discharge home.  Pt has issues with breastfeeding previously reviewed Breastfeeding resources. Pt has good plan of care.   Infant breastfeeding better than last baby.   CEM [place]

## 2018-03-29 ENCOUNTER — PRENATAL OFFICE VISIT (OUTPATIENT)
Dept: FAMILY MEDICINE | Facility: CLINIC | Age: 38
End: 2018-03-29
Payer: COMMERCIAL

## 2018-03-29 VITALS
HEART RATE: 96 BPM | SYSTOLIC BLOOD PRESSURE: 104 MMHG | DIASTOLIC BLOOD PRESSURE: 62 MMHG | OXYGEN SATURATION: 100 % | HEIGHT: 67 IN | BODY MASS INDEX: 20.65 KG/M2 | TEMPERATURE: 98.2 F | WEIGHT: 131.6 LBS | RESPIRATION RATE: 16 BRPM

## 2018-03-29 LAB — HGB BLD-MCNC: 12.3 G/DL (ref 11.7–15.7)

## 2018-03-29 PROCEDURE — 36415 COLL VENOUS BLD VENIPUNCTURE: CPT | Performed by: FAMILY MEDICINE

## 2018-03-29 PROCEDURE — 99207 ZZC POST PARTUM EXAM: CPT | Performed by: FAMILY MEDICINE

## 2018-03-29 PROCEDURE — 85018 HEMOGLOBIN: CPT | Performed by: FAMILY MEDICINE

## 2018-03-29 NOTE — NURSING NOTE
"No chief complaint on file.    /62  Pulse 96  Temp 98.2  F (36.8  C) (Oral)  Resp 16  Ht 5' 7\" (1.702 m)  Wt 131 lb 9.6 oz (59.7 kg)  LMP 05/21/2017 (Exact Date)  SpO2 100%  BMI 20.61 kg/m2 Estimated body mass index is 20.61 kg/(m^2) as calculated from the following:    Height as of this encounter: 5' 7\" (1.702 m).    Weight as of this encounter: 131 lb 9.6 oz (59.7 kg).  Medication Reconciliation: complete      Health Maintenance due pending provider review:  NONE    n/a    Norma Camacho CMA  "

## 2018-03-29 NOTE — PROGRESS NOTES
SUBJECTIVE: Megan is here for a 6-week postpartum checkup.    Delivery date was  18. She had a  of a viable girl, weight 8  pounds 0 oz., with none complications.  Since delivery, she has been breast feeding.  She has no signs of infection, bleeding or other complications.  She is not pregnant.  We discussed contraceptions and she has chosen condoms.  She  has not had intercourse since delivery and complains of NA discomfort. Patient screened for postpartum depression and complaints are NEGATIVE. Screening has also been completed for intimate partner violence.    EXAM:  Today's Depression Rating was   PHQ-9 SCORE 2016   Total Score 0       GENERAL healthy, alert and no distress  CVS: RRR  RESP: Clear to auscultation  GYN PELVIC: NEGATIVE healed posterior tear    ASSESSMENT:   Normal postpartum exam after .    PLAN:  Return as needed or at time of next expected pap, pelvic, or breast exam.

## 2018-03-29 NOTE — MR AVS SNAPSHOT
"              After Visit Summary   3/29/2018    Megan Young    MRN: [id number]           Patient Information     Date Of Birth          1980        Visit Information        Provider Department      3/29/2018 3:00 PM Alisia Chen MD [facility]        Today's Diagnoses     Routine postpartum follow-up    -  1       Follow-ups after your visit        Who to contact     If you have questions or need follow up information about today's clinic visit or your schedule please contact [facility] directly at [phone].  Normal or non-critical lab and imaging results will be communicated to you by [employer], letter or phone within 4 business days after the clinic has received the results. If you do not hear from us within 7 days, please contact the clinic through [employer] or phone. If you have a critical or abnormal lab result, we will notify you by phone as soon as possible.  Submit refill requests through [employer] or call your pharmacy and they will forward the refill request to us. Please allow 3 business days for your refill to be completed.          Additional Information About Your Visit        MyChart Information     [employer] gives you secure access to your electronic health record. If you see a primary care provider, you can also send messages to your care team and make appointments. If you have questions, please call your primary care clinic.  If you do not have a primary care provider, please call [phone] and they will assist you.        Care EveryWhere ID     This is your Care EveryWhere ID. This could be used by other organizations to access your [facility] medical records  [id number]        Your Vitals Were     Pulse Temperature Respirations Height Last Period Pulse Oximetry    96 98.2  F (36.8  C) (Oral) 16 5' 7\" (1.702 m) 05/21/2017 (Exact Date) 100%    Breastfeeding? BMI (Body Mass Index)                Yes 20.61 kg/m2           Blood Pressure from Last 3 " Encounters:   03/29/18 104/62   02/24/18 105/68   02/19/18 114/71    Weight from Last 3 Encounters:   03/29/18 131 lb 9.6 oz (59.7 kg)   02/22/18 154 lb (69.9 kg)   02/19/18 154 lb 8 oz (70.1 kg)              We Performed the Following     OB HEMOGLOBIN        Primary Care Provider Office Phone # Fax #    Alisia Chen [phone]       [address] [place] [id number]        Equal Access to Services     [facility]: Hadii nena watts hadasho Sosilva, waaxda luqadaha, qaybta kaalmafrank phelps, domingo bravo . So [facility] [phone].    ATENCIÓN: Si habla español, tiene a luis disposición servicios gratuitos de asistencia lingüística. [facility] [phone].    We comply with applicable federal civil rights laws and [place] laws. We do not discriminate on the basis of race, color, national origin, age, disability, sex, sexual orientation, or gender identity.            Thank you!     Thank you for choosing [facility]  for your care. Our goal is always to provide you with excellent care. Hearing back from our patients is one way we can continue to improve our services. Please take a few minutes to complete the written survey that you may receive in the mail after your visit with us. Thank you!             Your Updated Medication List - Protect others around you: Learn how to safely use, store and throw away your medicines at www.disposemymeds.org.          This list is accurate as of 3/29/18  3:34 PM.  Always use your most recent med list.                   Brand Name Dispense Instructions for use Diagnosis    prenatal multivitamin plus iron 27-0.8 MG Tabs per tablet      Take 1 tablet by mouth daily

## 2018-03-30 ASSESSMENT — PATIENT HEALTH QUESTIONNAIRE - PHQ9: SUM OF ALL RESPONSES TO PHQ QUESTIONS 1-9: 0

## 2019-03-22 ENCOUNTER — HEALTH MAINTENANCE LETTER (OUTPATIENT)
Age: 39
End: 2019-03-22

## 2019-11-08 ENCOUNTER — HEALTH MAINTENANCE LETTER (OUTPATIENT)
Age: 39
End: 2019-11-08

## 2020-02-23 ENCOUNTER — HEALTH MAINTENANCE LETTER (OUTPATIENT)
Age: 40
End: 2020-02-23

## 2020-12-06 ENCOUNTER — HEALTH MAINTENANCE LETTER (OUTPATIENT)
Age: 40
End: 2020-12-06

## 2021-09-25 ENCOUNTER — HEALTH MAINTENANCE LETTER (OUTPATIENT)
Age: 41
End: 2021-09-25

## 2022-01-15 ENCOUNTER — HEALTH MAINTENANCE LETTER (OUTPATIENT)
Age: 42
End: 2022-01-15

## 2023-01-07 ENCOUNTER — HEALTH MAINTENANCE LETTER (OUTPATIENT)
Age: 43
End: 2023-01-07

## 2023-04-22 ENCOUNTER — HEALTH MAINTENANCE LETTER (OUTPATIENT)
Age: 43
End: 2023-04-22

## 2024-02-10 ENCOUNTER — HEALTH MAINTENANCE LETTER (OUTPATIENT)
Age: 44
End: 2024-02-10